# Patient Record
Sex: FEMALE | Race: WHITE | NOT HISPANIC OR LATINO | Employment: UNEMPLOYED | URBAN - METROPOLITAN AREA
[De-identification: names, ages, dates, MRNs, and addresses within clinical notes are randomized per-mention and may not be internally consistent; named-entity substitution may affect disease eponyms.]

---

## 2020-07-24 ENCOUNTER — HOSPITAL ENCOUNTER (INPATIENT)
Facility: HOSPITAL | Age: 65
LOS: 2 days | Discharge: HOME | DRG: 194 | End: 2020-07-26
Attending: EMERGENCY MEDICINE | Admitting: INTERNAL MEDICINE
Payer: MEDICARE

## 2020-07-24 ENCOUNTER — APPOINTMENT (EMERGENCY)
Dept: RADIOLOGY | Facility: HOSPITAL | Age: 65
DRG: 194 | End: 2020-07-24
Payer: MEDICARE

## 2020-07-24 DIAGNOSIS — E11.9 T2DM (TYPE 2 DIABETES MELLITUS) (HCC): ICD-10-CM

## 2020-07-24 DIAGNOSIS — R06.00 DYSPNEA: Primary | ICD-10-CM

## 2020-07-24 DIAGNOSIS — Z20.822 SUSPECTED COVID-19 VIRUS INFECTION: ICD-10-CM

## 2020-07-24 DIAGNOSIS — J18.9 PNEUMONIA: ICD-10-CM

## 2020-07-24 DIAGNOSIS — I10 HTN (HYPERTENSION): ICD-10-CM

## 2020-07-24 DIAGNOSIS — G25.81 RESTLESS LEG: ICD-10-CM

## 2020-07-24 DIAGNOSIS — R09.02 HYPOXIA: ICD-10-CM

## 2020-07-24 PROBLEM — I25.10 CAD (CORONARY ARTERY DISEASE): Status: ACTIVE | Noted: 2020-07-24

## 2020-07-24 PROBLEM — N18.9 CKD (CHRONIC KIDNEY DISEASE): Status: ACTIVE | Noted: 2020-07-24

## 2020-07-24 PROBLEM — K29.70 GASTRITIS: Status: ACTIVE | Noted: 2020-07-24

## 2020-07-24 PROBLEM — N18.30 CKD (CHRONIC KIDNEY DISEASE) STAGE 3, GFR 30-59 ML/MIN (HCC): Status: ACTIVE | Noted: 2020-07-24

## 2020-07-24 PROBLEM — R06.02 SOB (SHORTNESS OF BREATH): Status: ACTIVE | Noted: 2020-07-24

## 2020-07-24 LAB
ABO GROUP BLD: NORMAL
ALBUMIN SERPL BCP-MCNC: 2.6 G/DL (ref 3.5–5)
ALP SERPL-CCNC: 129 U/L (ref 46–116)
ALT SERPL W P-5'-P-CCNC: 40 U/L (ref 12–78)
ANION GAP SERPL CALCULATED.3IONS-SCNC: 10 MMOL/L (ref 4–13)
APTT PPP: 38 SECONDS (ref 23–37)
AST SERPL W P-5'-P-CCNC: 28 U/L (ref 5–45)
BACTERIA UR QL AUTO: ABNORMAL /HPF
BASOPHILS # BLD AUTO: 0.03 THOUSANDS/ΜL (ref 0–0.1)
BASOPHILS NFR BLD AUTO: 0 % (ref 0–1)
BILIRUB SERPL-MCNC: 0.3 MG/DL (ref 0.2–1)
BILIRUB UR QL STRIP: NEGATIVE
BUN SERPL-MCNC: 39 MG/DL (ref 5–25)
CALCIUM SERPL-MCNC: 8.6 MG/DL (ref 8.3–10.1)
CHLORIDE SERPL-SCNC: 101 MMOL/L (ref 100–108)
CK SERPL-CCNC: 61 U/L (ref 26–192)
CLARITY UR: ABNORMAL
CO2 SERPL-SCNC: 27 MMOL/L (ref 21–32)
COLOR UR: YELLOW
CREAT SERPL-MCNC: 1.99 MG/DL (ref 0.6–1.3)
CRP SERPL QL: 180 MG/L
D DIMER PPP FEU-MCNC: 1.06 UG/ML FEU
EOSINOPHIL # BLD AUTO: 0.36 THOUSAND/ΜL (ref 0–0.61)
EOSINOPHIL NFR BLD AUTO: 4 % (ref 0–6)
ERYTHROCYTE [DISTWIDTH] IN BLOOD BY AUTOMATED COUNT: 17.4 % (ref 11.6–15.1)
FERRITIN SERPL-MCNC: 115 NG/ML (ref 8–388)
GFR SERPL CREATININE-BSD FRML MDRD: 26 ML/MIN/1.73SQ M
GLUCOSE SERPL-MCNC: 102 MG/DL (ref 65–140)
GLUCOSE SERPL-MCNC: 107 MG/DL (ref 65–140)
GLUCOSE UR STRIP-MCNC: NEGATIVE MG/DL
HCT VFR BLD AUTO: 35 % (ref 34.8–46.1)
HGB BLD-MCNC: 11.6 G/DL (ref 11.5–15.4)
HGB UR QL STRIP.AUTO: NEGATIVE
IMM GRANULOCYTES # BLD AUTO: 0.03 THOUSAND/UL (ref 0–0.2)
IMM GRANULOCYTES NFR BLD AUTO: 0 % (ref 0–2)
INR PPP: 0.97 (ref 0.84–1.19)
KETONES UR STRIP-MCNC: NEGATIVE MG/DL
LACTATE SERPL-SCNC: 0.9 MMOL/L (ref 0.5–2)
LEUKOCYTE ESTERASE UR QL STRIP: ABNORMAL
LYMPHOCYTES # BLD AUTO: 1.14 THOUSANDS/ΜL (ref 0.6–4.47)
LYMPHOCYTES NFR BLD AUTO: 13 % (ref 14–44)
MCH RBC QN AUTO: 29.4 PG (ref 26.8–34.3)
MCHC RBC AUTO-ENTMCNC: 33.1 G/DL (ref 31.4–37.4)
MCV RBC AUTO: 89 FL (ref 82–98)
MONOCYTES # BLD AUTO: 0.67 THOUSAND/ΜL (ref 0.17–1.22)
MONOCYTES NFR BLD AUTO: 8 % (ref 4–12)
NEUTROPHILS # BLD AUTO: 6.47 THOUSANDS/ΜL (ref 1.85–7.62)
NEUTS SEG NFR BLD AUTO: 75 % (ref 43–75)
NITRITE UR QL STRIP: NEGATIVE
NON-SQ EPI CELLS URNS QL MICRO: ABNORMAL /HPF
NRBC BLD AUTO-RTO: 0 /100 WBCS
NT-PROBNP SERPL-MCNC: 1035 PG/ML
OTHER STN SPEC: ABNORMAL
PH UR STRIP.AUTO: 6 [PH]
PLATELET # BLD AUTO: 175 THOUSANDS/UL (ref 149–390)
PLATELET # BLD AUTO: 187 THOUSANDS/UL (ref 149–390)
PMV BLD AUTO: 12.3 FL (ref 8.9–12.7)
PMV BLD AUTO: 12.5 FL (ref 8.9–12.7)
POTASSIUM SERPL-SCNC: 3.9 MMOL/L (ref 3.5–5.3)
PROCALCITONIN SERPL-MCNC: 0.26 NG/ML
PROT SERPL-MCNC: 7.2 G/DL (ref 6.4–8.2)
PROT UR STRIP-MCNC: NEGATIVE MG/DL
PROTHROMBIN TIME: 12.8 SECONDS (ref 11.6–14.5)
RBC # BLD AUTO: 3.94 MILLION/UL (ref 3.81–5.12)
RBC #/AREA URNS AUTO: ABNORMAL /HPF
RH BLD: POSITIVE
SARS-COV-2 RNA RESP QL NAA+PROBE: NEGATIVE
SODIUM SERPL-SCNC: 138 MMOL/L (ref 136–145)
SP GR UR STRIP.AUTO: 1.01 (ref 1–1.03)
TROPONIN I SERPL-MCNC: <0.02 NG/ML
TROPONIN I SERPL-MCNC: <0.02 NG/ML
UROBILINOGEN UR QL STRIP.AUTO: 0.2 E.U./DL
WBC # BLD AUTO: 8.7 THOUSAND/UL (ref 4.31–10.16)
WBC #/AREA URNS AUTO: ABNORMAL /HPF

## 2020-07-24 PROCEDURE — 86140 C-REACTIVE PROTEIN: CPT | Performed by: NURSE PRACTITIONER

## 2020-07-24 PROCEDURE — 96360 HYDRATION IV INFUSION INIT: CPT

## 2020-07-24 PROCEDURE — 99285 EMERGENCY DEPT VISIT HI MDM: CPT

## 2020-07-24 PROCEDURE — 87635 SARS-COV-2 COVID-19 AMP PRB: CPT | Performed by: EMERGENCY MEDICINE

## 2020-07-24 PROCEDURE — 85379 FIBRIN DEGRADATION QUANT: CPT | Performed by: EMERGENCY MEDICINE

## 2020-07-24 PROCEDURE — 85730 THROMBOPLASTIN TIME PARTIAL: CPT | Performed by: EMERGENCY MEDICINE

## 2020-07-24 PROCEDURE — 83520 IMMUNOASSAY QUANT NOS NONAB: CPT | Performed by: NURSE PRACTITIONER

## 2020-07-24 PROCEDURE — 87040 BLOOD CULTURE FOR BACTERIA: CPT | Performed by: EMERGENCY MEDICINE

## 2020-07-24 PROCEDURE — 99283 EMERGENCY DEPT VISIT LOW MDM: CPT | Performed by: EMERGENCY MEDICINE

## 2020-07-24 PROCEDURE — 84484 ASSAY OF TROPONIN QUANT: CPT | Performed by: EMERGENCY MEDICINE

## 2020-07-24 PROCEDURE — 80053 COMPREHEN METABOLIC PANEL: CPT | Performed by: EMERGENCY MEDICINE

## 2020-07-24 PROCEDURE — 81001 URINALYSIS AUTO W/SCOPE: CPT | Performed by: EMERGENCY MEDICINE

## 2020-07-24 PROCEDURE — 99223 1ST HOSP IP/OBS HIGH 75: CPT | Performed by: INTERNAL MEDICINE

## 2020-07-24 PROCEDURE — 93005 ELECTROCARDIOGRAM TRACING: CPT

## 2020-07-24 PROCEDURE — 87086 URINE CULTURE/COLONY COUNT: CPT | Performed by: EMERGENCY MEDICINE

## 2020-07-24 PROCEDURE — 84145 PROCALCITONIN (PCT): CPT | Performed by: EMERGENCY MEDICINE

## 2020-07-24 PROCEDURE — 83880 ASSAY OF NATRIURETIC PEPTIDE: CPT | Performed by: EMERGENCY MEDICINE

## 2020-07-24 PROCEDURE — 86900 BLOOD TYPING SEROLOGIC ABO: CPT | Performed by: NURSE PRACTITIONER

## 2020-07-24 PROCEDURE — 71045 X-RAY EXAM CHEST 1 VIEW: CPT

## 2020-07-24 PROCEDURE — 83605 ASSAY OF LACTIC ACID: CPT | Performed by: EMERGENCY MEDICINE

## 2020-07-24 PROCEDURE — 86901 BLOOD TYPING SEROLOGIC RH(D): CPT | Performed by: NURSE PRACTITIONER

## 2020-07-24 PROCEDURE — 85610 PROTHROMBIN TIME: CPT | Performed by: EMERGENCY MEDICINE

## 2020-07-24 PROCEDURE — 82550 ASSAY OF CK (CPK): CPT | Performed by: NURSE PRACTITIONER

## 2020-07-24 PROCEDURE — 85049 AUTOMATED PLATELET COUNT: CPT | Performed by: INTERNAL MEDICINE

## 2020-07-24 PROCEDURE — 85025 COMPLETE CBC W/AUTO DIFF WBC: CPT | Performed by: EMERGENCY MEDICINE

## 2020-07-24 PROCEDURE — 82728 ASSAY OF FERRITIN: CPT | Performed by: NURSE PRACTITIONER

## 2020-07-24 PROCEDURE — 84484 ASSAY OF TROPONIN QUANT: CPT | Performed by: NURSE PRACTITIONER

## 2020-07-24 PROCEDURE — 36415 COLL VENOUS BLD VENIPUNCTURE: CPT | Performed by: EMERGENCY MEDICINE

## 2020-07-24 PROCEDURE — 82948 REAGENT STRIP/BLOOD GLUCOSE: CPT

## 2020-07-24 RX ORDER — AZITHROMYCIN 250 MG/1
500 TABLET, FILM COATED ORAL ONCE
Status: DISCONTINUED | OUTPATIENT
Start: 2020-07-24 | End: 2020-07-24

## 2020-07-24 RX ORDER — CEPHALEXIN 500 MG/1
500 CAPSULE ORAL EVERY 8 HOURS SCHEDULED
COMMUNITY
End: 2020-07-26 | Stop reason: HOSPADM

## 2020-07-24 RX ORDER — ENOXAPARIN SODIUM 300 MG/3ML
1 INJECTION INTRAVENOUS; SUBCUTANEOUS ONCE
Status: DISCONTINUED | OUTPATIENT
Start: 2020-07-24 | End: 2020-07-24 | Stop reason: CLARIF

## 2020-07-24 RX ORDER — HEPARIN SODIUM 5000 [USP'U]/ML
5000 INJECTION, SOLUTION INTRAVENOUS; SUBCUTANEOUS EVERY 8 HOURS SCHEDULED
Status: DISCONTINUED | OUTPATIENT
Start: 2020-07-24 | End: 2020-07-26 | Stop reason: HOSPADM

## 2020-07-24 RX ORDER — SACCHAROMYCES BOULARDII 250 MG
250 CAPSULE ORAL 2 TIMES DAILY
Status: DISCONTINUED | OUTPATIENT
Start: 2020-07-24 | End: 2020-07-26 | Stop reason: HOSPADM

## 2020-07-24 RX ORDER — ACETAMINOPHEN 325 MG/1
650 TABLET ORAL EVERY 6 HOURS PRN
Status: DISCONTINUED | OUTPATIENT
Start: 2020-07-24 | End: 2020-07-26 | Stop reason: HOSPADM

## 2020-07-24 RX ORDER — ACETAMINOPHEN 325 MG/1
650 TABLET ORAL EVERY 6 HOURS PRN
Status: DISCONTINUED | OUTPATIENT
Start: 2020-07-24 | End: 2020-07-24 | Stop reason: SDUPTHER

## 2020-07-24 RX ORDER — SUCRALFATE 1 G/1
1 TABLET ORAL 4 TIMES DAILY
Status: DISCONTINUED | OUTPATIENT
Start: 2020-07-24 | End: 2020-07-26 | Stop reason: HOSPADM

## 2020-07-24 RX ORDER — ZINC SULFATE 50(220)MG
220 CAPSULE ORAL DAILY
Status: DISCONTINUED | OUTPATIENT
Start: 2020-07-24 | End: 2020-07-26 | Stop reason: HOSPADM

## 2020-07-24 RX ORDER — SUCRALFATE 1 G/1
1 TABLET ORAL 4 TIMES DAILY
COMMUNITY

## 2020-07-24 RX ORDER — CEFTRIAXONE 1 G/50ML
1000 INJECTION, SOLUTION INTRAVENOUS EVERY 24 HOURS
Status: DISCONTINUED | OUTPATIENT
Start: 2020-07-25 | End: 2020-07-25

## 2020-07-24 RX ORDER — NICOTINE 21 MG/24HR
1 PATCH, TRANSDERMAL 24 HOURS TRANSDERMAL DAILY
Status: DISCONTINUED | OUTPATIENT
Start: 2020-07-25 | End: 2020-07-24 | Stop reason: SDUPTHER

## 2020-07-24 RX ORDER — FLUOXETINE HYDROCHLORIDE 20 MG/1
40 CAPSULE ORAL 2 TIMES DAILY
Status: DISCONTINUED | OUTPATIENT
Start: 2020-07-24 | End: 2020-07-26 | Stop reason: HOSPADM

## 2020-07-24 RX ORDER — LORATADINE 10 MG/1
10 TABLET ORAL DAILY
COMMUNITY

## 2020-07-24 RX ORDER — ASCORBIC ACID 500 MG
1000 TABLET ORAL 2 TIMES DAILY
Status: DISCONTINUED | OUTPATIENT
Start: 2020-07-24 | End: 2020-07-26 | Stop reason: HOSPADM

## 2020-07-24 RX ORDER — PANTOPRAZOLE SODIUM 40 MG/1
40 TABLET, DELAYED RELEASE ORAL DAILY
COMMUNITY

## 2020-07-24 RX ORDER — LORATADINE 10 MG/1
10 TABLET ORAL DAILY
Status: DISCONTINUED | OUTPATIENT
Start: 2020-07-25 | End: 2020-07-26 | Stop reason: HOSPADM

## 2020-07-24 RX ORDER — BUSPIRONE HYDROCHLORIDE 15 MG/1
15 TABLET ORAL 2 TIMES DAILY
COMMUNITY

## 2020-07-24 RX ORDER — AZITHROMYCIN 250 MG/1
250 TABLET, FILM COATED ORAL DAILY
Qty: 4 TABLET | Refills: 0 | Status: SHIPPED | OUTPATIENT
Start: 2020-07-24 | End: 2020-07-26 | Stop reason: HOSPADM

## 2020-07-24 RX ORDER — NICOTINE 21 MG/24HR
1 PATCH, TRANSDERMAL 24 HOURS TRANSDERMAL DAILY
Status: DISCONTINUED | OUTPATIENT
Start: 2020-07-25 | End: 2020-07-26 | Stop reason: HOSPADM

## 2020-07-24 RX ORDER — ARIPIPRAZOLE 15 MG/1
15 TABLET ORAL DAILY
COMMUNITY

## 2020-07-24 RX ORDER — FELODIPINE 2.5 MG/1
5 TABLET, EXTENDED RELEASE ORAL
Status: DISCONTINUED | OUTPATIENT
Start: 2020-07-25 | End: 2020-07-26

## 2020-07-24 RX ORDER — ALBUTEROL SULFATE 90 UG/1
2 AEROSOL, METERED RESPIRATORY (INHALATION) EVERY 6 HOURS PRN
COMMUNITY

## 2020-07-24 RX ORDER — PANTOPRAZOLE SODIUM 40 MG/1
40 TABLET, DELAYED RELEASE ORAL DAILY
Status: DISCONTINUED | OUTPATIENT
Start: 2020-07-25 | End: 2020-07-26 | Stop reason: HOSPADM

## 2020-07-24 RX ORDER — SODIUM CHLORIDE 9 MG/ML
75 INJECTION, SOLUTION INTRAVENOUS CONTINUOUS
Status: DISCONTINUED | OUTPATIENT
Start: 2020-07-24 | End: 2020-07-25

## 2020-07-24 RX ORDER — ONDANSETRON 2 MG/ML
4 INJECTION INTRAMUSCULAR; INTRAVENOUS EVERY 6 HOURS PRN
Status: DISCONTINUED | OUTPATIENT
Start: 2020-07-24 | End: 2020-07-26 | Stop reason: HOSPADM

## 2020-07-24 RX ORDER — MELATONIN
2000 DAILY
Status: DISCONTINUED | OUTPATIENT
Start: 2020-07-24 | End: 2020-07-26 | Stop reason: HOSPADM

## 2020-07-24 RX ORDER — DOXYCYCLINE HYCLATE 100 MG/1
100 CAPSULE ORAL EVERY 12 HOURS SCHEDULED
Status: DISCONTINUED | OUTPATIENT
Start: 2020-07-24 | End: 2020-07-26 | Stop reason: HOSPADM

## 2020-07-24 RX ORDER — ONDANSETRON 2 MG/ML
4 INJECTION INTRAMUSCULAR; INTRAVENOUS EVERY 6 HOURS PRN
Status: DISCONTINUED | OUTPATIENT
Start: 2020-07-24 | End: 2020-07-24 | Stop reason: SDUPTHER

## 2020-07-24 RX ORDER — IBUPROFEN 400 MG/1
400 TABLET ORAL EVERY 8 HOURS PRN
COMMUNITY

## 2020-07-24 RX ORDER — ALBUTEROL SULFATE 90 UG/1
2 AEROSOL, METERED RESPIRATORY (INHALATION) EVERY 4 HOURS PRN
Status: DISCONTINUED | OUTPATIENT
Start: 2020-07-24 | End: 2020-07-26 | Stop reason: HOSPADM

## 2020-07-24 RX ORDER — HYDROCHLOROTHIAZIDE 25 MG/1
25 TABLET ORAL DAILY
COMMUNITY

## 2020-07-24 RX ORDER — FLUOXETINE HYDROCHLORIDE 40 MG/1
40 CAPSULE ORAL 2 TIMES DAILY
COMMUNITY

## 2020-07-24 RX ADMIN — AZITHROMYCIN MONOHYDRATE 500 MG: 500 INJECTION, POWDER, LYOPHILIZED, FOR SOLUTION INTRAVENOUS at 18:36

## 2020-07-24 RX ADMIN — OXYCODONE HYDROCHLORIDE AND ACETAMINOPHEN 1000 MG: 500 TABLET ORAL at 21:12

## 2020-07-24 RX ADMIN — VITAMIN D, TAB 1000IU (100/BT) 2000 UNITS: 25 TAB at 21:12

## 2020-07-24 RX ADMIN — ZINC SULFATE 220 MG (50 MG) CAPSULE 220 MG: CAPSULE at 21:12

## 2020-07-24 RX ADMIN — HEPARIN SODIUM 5000 UNITS: 5000 INJECTION INTRAVENOUS; SUBCUTANEOUS at 21:19

## 2020-07-24 RX ADMIN — SODIUM CHLORIDE 500 ML: 0.9 INJECTION, SOLUTION INTRAVENOUS at 17:24

## 2020-07-24 RX ADMIN — BUSPIRONE HYDROCHLORIDE 15 MG: 10 TABLET ORAL at 21:12

## 2020-07-24 RX ADMIN — DOXYCYCLINE 100 MG: 100 CAPSULE ORAL at 21:12

## 2020-07-24 RX ADMIN — SUCRALFATE 1 G: 1 TABLET ORAL at 21:19

## 2020-07-24 RX ADMIN — SODIUM CHLORIDE 75 ML/HR: 0.9 INJECTION, SOLUTION INTRAVENOUS at 20:54

## 2020-07-24 RX ADMIN — Medication 250 MG: at 21:12

## 2020-07-24 RX ADMIN — FLUOXETINE 40 MG: 20 CAPSULE ORAL at 21:12

## 2020-07-24 NOTE — H&P
H&P- Manjeet Hernandez 1955, 75 y o  female MRN: [id number]    Unit/Bed#: ED 10 Encounter: [id number]    Primary Care Provider: No primary care provider on file  Date and time admitted to hospital: 7/24/2020  2:59 PM        * SOB (shortness of breath)  Assessment & Plan  Patient present to the ED with shortness of breath for 3 days  Patient is visiting from [place] and arrived to [place] on 17th  Patient does have fever with some chills  Patient has cough with mucus production  In the ED patient had low-grade fever and tachypnea  O2 saturation was in low 90s but dropped to high 80s with exertion  COVID-19 testing was negative  Chest x-ray showed bilateral ground-glass densities suspicious for COVID infection  Patient will be on IV Rocephin and Zithromax  Check sputum culture, procalcitonin level, urine for Legionella pneumococcal antigens  Will also get a CT chest to assess the lung parenchyma  Pulmonary consultation    Suspected Covid-19 Virus Infection  Assessment & Plan  Patient is visiting from [place]  COVID-19 testing was negative in ED  Chest x-ray showed bilateral ground-glass opacities  Patient's O2 saturation was low 90s at rest but dropping to high 80s with exertion  Will check blood type, cardiac markers, CRP, ferritin  Check IL 6 level  Patient will be on vitamin D3 2000 units daily  CT chest ordered    CAD (coronary artery disease)  Assessment & Plan  Patient follows up with cardiology cardiology recommended outpatient nuclear stress test  No symptoms of angina at the current time  Initial troponin was negative  Monitor serial troponins    T2DM (type 2 diabetes mellitus) ([facility] [address] )  Assessment & Plan  No results found for: HGBA1C    No results for input(s): POCGLU in the last 72 hours  Blood Sugar Average: Last 72 hrs:  Patient is on metformin which will be held due to elevated creatinine  Patient will be on Humalog sliding scale with Accu-Cheks q a c   And HS        Gastritis  Assessment & Plan  Continue Protonix and Carafate    HTN (hypertension)  Assessment & Plan  Patient is on hydrochlorothiazide will be held at the current time    CKD (chronic kidney disease) stage 3, GFR 30-59 ml/min ([facility])  Assessment & Plan  Patient's baseline creatinine appears to be around 1 5-1 9 recently  Creatinine slightly above baseline  Will give gentle IV hydration  Hold metformin for now  Avoid nephrotoxic agents and hypotension  Hold hydrochlorothiazide        VTE Prophylaxis: Heparin  / sequential compression device   Code Status:  Full code    Discussion with family:  Daughter bedside    Anticipated Length of Stay:  Patient will be admitted on an Inpatient basis with an anticipated length of stay of  more than 2 midnights  Justification for Hospital Stay:  Shortness of breath and suspected COVID infection    Total Time for Visit, including Counseling / Coordination of Care: 1 hour  Greater than 50% of this total time spent on direct patient counseling and coordination of care  Chief Complaint:       History of Present Illness:    Amna Barboza is a 75 y o  female with past medical history of chronic kidney disease stage 3, CAD hypertension, hyperlipidemia , depression , carotid disease who presents with shortness of breath for 4 days  Patient reports she is from [place] and came to [place]  Patient complains of shortness of breath which is especially worse with exertion  Patient does complain of cough with mucus phlegm  Patient did have fever with chills yesterday  Patient denies any chest pain but complains of chest tightness  Patient denies any abdominal pain, nausea, vomiting, headache or dizziness  Patient present to the ED where she had low-grade temperature and was slightly tachypneic  Patient's O2 saturation was low 90s on room air but dropped into high 80s on exertion  Labs showed BUN creatinine of 39 and 1 9 slightly elevated proBNP of 1000  Patient's D-dimer was 1 06    Patient's COVID-19 testing was negative  Patient's chest x-ray showed bilateral ground-glass densities suggesting pneumonia    Review of Systems:    Review of Systems   Constitutional: Positive for fever  Negative for activity change, appetite change, chills, diaphoresis, fatigue and unexpected weight change  HENT: Negative for congestion, ear discharge, ear pain, facial swelling, hearing loss, mouth sores, nosebleeds, postnasal drip, rhinorrhea, sinus pressure, sneezing, sore throat, tinnitus, trouble swallowing and voice change  Eyes: Negative for photophobia, discharge, redness and visual disturbance  Respiratory: Positive for cough, chest tightness and shortness of breath  Negative for wheezing and stridor  Cardiovascular: Negative for chest pain, palpitations and leg swelling  Gastrointestinal: Negative for abdominal distention, abdominal pain, anal bleeding, blood in stool, constipation, diarrhea, nausea and vomiting  Endocrine: Negative for polydipsia, polyphagia and polyuria  Genitourinary: Negative for decreased urine volume, difficulty urinating, dysuria, flank pain, hematuria, menstrual problem, pelvic pain, urgency, vaginal bleeding and vaginal discharge  Musculoskeletal: Negative for arthralgias, back pain and neck stiffness  Skin: Negative for pallor and rash  Neurological: Negative for dizziness, seizures, facial asymmetry, speech difficulty, light-headedness, numbness and headaches  Hematological: Negative for adenopathy  Psychiatric/Behavioral: Negative for agitation and confusion         Past Medical and Surgical History:     Past Medical History:   Diagnosis Date    CAD (coronary artery disease)     Carotid arterial disease ([place] [address] )     CKD (chronic kidney disease) stage 3, GFR 30-59 ml/min ([facility])     Gastritis     HTN (hypertension)     T2DM (type 2 diabetes mellitus) ([place] [address] )        Past Surgical History:   Procedure Laterality Date    ABDOMINAL HERNIA REPAIR      APPENDECTOMY  BACK SURGERY      CHOLECYSTECTOMY      TOTAL HIP ARTHROPLASTY         Meds/Allergies:    Prior to Admission medications    Medication Sig Start Date End Date Taking? Authorizing Provider   albuterol (PROVENTIL HFA,VENTOLIN HFA) 90 mcg/act inhaler Inhale 2 puffs every 6 (six) hours as needed for wheezing   Yes Historical Provider, MD   ARIPiprazole (ABILIFY) 15 mg tablet Take 15 mg by mouth daily   Yes Historical Provider, MD   busPIRone (BUSPAR) 15 mg tablet Take 15 mg by mouth 2 (two) times a day   Yes Historical Provider, MD   cephalexin (KEFLEX) 500 mg capsule Take 500 mg by mouth every 8 (eight) hours   Yes Historical Provider, MD   FLUoxetine (PROzac) 40 MG capsule Take 40 mg by mouth 2 (two) times a day   Yes Historical Provider, MD   hydrochlorothiazide (HYDRODIURIL) 25 mg tablet Take 25 mg by mouth daily   Yes Historical Provider, MD   ibuprofen (MOTRIN) 400 mg tablet Take 400 mg by mouth every 8 (eight) hours as needed for mild pain   Yes Historical Provider, MD   loratadine (CLARITIN) 10 mg tablet Take 10 mg by mouth daily   Yes Historical Provider, MD   metFORMIN (GLUCOPHAGE) 500 mg tablet Take 500 mg by mouth 2 (two) times a day with meals   Yes Historical Provider, MD   pantoprazole (PROTONIX) 40 mg tablet Take 40 mg by mouth daily   Yes Historical Provider, MD   sucralfate (CARAFATE) 1 g tablet Take 1 g by mouth 4 (four) times a day   Yes Historical Provider, MD   azithromycin (ZITHROMAX) 250 mg tablet Take 1 tablet (250 mg total) by mouth daily for 4 days 7/24/20 9/86/74  Prabha Beckford MD     I have reviewed home medications using allscripts  Allergies:    Allergies   Allergen Reactions    Penicillins     Sulfa Antibiotics        Social History:     Marital Status:      Substance Use History:   Social History     Substance and Sexual Activity   Alcohol Use Not Currently     Social History     Tobacco Use   Smoking Status Current Every Day Smoker    Packs/day: 0 50    Years: 40 00  Pack years: 20 00    Types: Cigarettes   Smokeless Tobacco Never Used     Social History     Substance and Sexual Activity   Drug Use Never       Family History:    Family History   Problem Relation Age of Onset    Diabetes Mother     Hypertension Mother     COPD Father     Hypertension Father        Physical Exam:     Vitals:   Blood Pressure: 161/73 (07/24/20 1930)  Pulse: 86 (07/24/20 1930)  Temperature: 99 1 °F (37 3 °C) (07/24/20 1458)  Temp Source: Tympanic (07/24/20 1458)  Respirations: (!) 24 (07/24/20 1900)  SpO2: 97 % (07/24/20 1930)    Physical Exam   Constitutional: No distress  HENT:   Head: Normocephalic and atraumatic  Nose: Nose normal    Eyes: Pupils are equal, round, and reactive to light  Conjunctivae are normal    Neck: Normal range of motion  Neck supple  Cardiovascular: Normal rate, regular rhythm and normal heart sounds  Pulmonary/Chest: Effort normal and breath sounds normal  No respiratory distress  She has no wheezes  She has no rales  Abdominal: Soft  Bowel sounds are normal  She exhibits no distension  There is no tenderness  There is no rebound and no guarding  Musculoskeletal: She exhibits no edema  Neurological: She is alert  No cranial nerve deficit  Skin: Skin is warm and dry  No rash noted           Additional Data:     Lab Results: I have personally reviewed pertinent films in PACS    Results from last 7 days   Lab Units 07/24/20  1534   WBC Thousand/uL 8 70   HEMOGLOBIN g/dL 11 6   HEMATOCRIT % 35 0   PLATELETS Thousands/uL 187   NEUTROS PCT % 75   LYMPHS PCT % 13*   MONOS PCT % 8   EOS PCT % 4     Results from last 7 days   Lab Units 07/24/20  1606   SODIUM mmol/L 138   POTASSIUM mmol/L 3 9   CHLORIDE mmol/L 101   CO2 mmol/L 27   BUN mg/dL 39*   CREATININE mg/dL 1 99*   ANION GAP mmol/L 10   CALCIUM mg/dL 8 6   ALBUMIN g/dL 2 6*   TOTAL BILIRUBIN mg/dL 0 30   ALK PHOS U/L 129*   ALT U/L 40   AST U/L 28   GLUCOSE RANDOM mg/dL 107     Results from last 7 days   Lab Units 07/24/20  1606   INR  0 97             Results from last 7 days   Lab Units 07/24/20  1534   LACTIC ACID mmol/L 0 9       Imaging: I have personally reviewed pertinent films in PACS    XR chest 1 view portable   Final Result by Marley Ferrari MD (07/24 1719)      Patchy bilateral groundglass opacity, question viral infection, possibly Covid-19 pneumonia, despite negative PCR  The study was marked in [facility] for immediate notification  Workstation performed: [id number]             EKG, Pathology, and Other Studies Reviewed on Admission:   · EKG:  EKG shows normal sinus rhythm at 88 beats per minute with nonspecific T changes    Allscripts / Epic Records Reviewed: Yes     ** Please Note: This note has been constructed using a voice recognition system   **

## 2020-07-24 NOTE — ED PROVIDER NOTES
History  Chief Complaint   Patient presents with    Shortness of Breath     patient c/o SOB and possible fever x four days  denies cough  74 yo female visiting daughter from [place], has been here x 7 days  C/o sob x 4 days  Has felt feverish but did not take temp  No cough  No vomiting or diarrhea  No heart problems or asthma or COPD    + smoker  History provided by:  Patient   used: No    Shortness of Breath   Associated symptoms: fever    Associated symptoms: no abdominal pain, no chest pain, no cough, no headaches, no rash and no vomiting        Prior to Admission Medications   Prescriptions Last Dose Informant Patient Reported? Taking? cephalexin (KEFLEX) 500 mg capsule   Yes Yes   Sig: Take 500 mg by mouth every 8 (eight) hours   hydrochlorothiazide (HYDRODIURIL) 25 mg tablet 7/24/2020 at Unknown time  Yes Yes   Sig: Take 25 mg by mouth daily   ibuprofen (MOTRIN) 400 mg tablet   Yes Yes   Sig: Take 400 mg by mouth every 8 (eight) hours as needed for mild pain   loratadine (CLARITIN) 10 mg tablet   Yes Yes   Sig: Take 10 mg by mouth daily      Facility-Administered Medications: None       History reviewed  No pertinent past medical history  History reviewed  No pertinent surgical history  History reviewed  No pertinent family history  I have reviewed and agree with the history as documented  E-Cigarette/Vaping     E-Cigarette/Vaping Substances     Social History     Tobacco Use    Smoking status: Current Every Day Smoker     Packs/day: 0 50     Types: Cigarettes    Smokeless tobacco: Never Used   Substance Use Topics    Alcohol use: Not Currently    Drug use: Never       Review of Systems   Constitutional: Positive for chills and fever  HENT: Negative  Eyes: Negative  Respiratory: Positive for shortness of breath  Negative for cough  Cardiovascular: Negative  Negative for chest pain  Gastrointestinal: Negative    Negative for abdominal pain, diarrhea, nausea and vomiting  Genitourinary: Negative  Negative for dysuria and flank pain  Musculoskeletal: Negative  Negative for back pain and myalgias  Skin: Negative  Negative for rash and wound  Neurological: Negative  Negative for dizziness and headaches  Hematological: Does not bruise/bleed easily  Psychiatric/Behavioral: Negative  All other systems reviewed and are negative  Physical Exam  Physical Exam   Constitutional: She is oriented to person, place, and time  She appears well-developed and well-nourished  No distress  HENT:   Head: Normocephalic and atraumatic  Eyes: Conjunctivae are normal  No scleral icterus  Neck: Normal range of motion  Neck supple  Cardiovascular: Normal rate, regular rhythm and normal heart sounds  No murmur heard  Pulmonary/Chest: Effort normal and breath sounds normal  No respiratory distress  Abdominal: Soft  Bowel sounds are normal  She exhibits no distension  There is no tenderness  Musculoskeletal: Normal range of motion  She exhibits no edema, tenderness or deformity  No post calf tenderness   Neurological: She is alert and oriented to person, place, and time  No cranial nerve deficit  She exhibits normal muscle tone  Skin: Skin is warm and dry  No rash noted  She is not diaphoretic  No pallor  Psychiatric: She has a normal mood and affect  Her behavior is normal    Nursing note and vitals reviewed        Vital Signs  ED Triage Vitals [07/24/20 1458]   Temperature Pulse Respirations Blood Pressure SpO2   99 1 °F (37 3 °C) 99 (!) 24 169/78 96 %      Temp Source Heart Rate Source Patient Position - Orthostatic VS BP Location FiO2 (%)   Tympanic Monitor Sitting Right arm --      Pain Score       5           Vitals:    07/24/20 1458 07/24/20 1730   BP: 169/78 148/67   Pulse: 99 84   Patient Position - Orthostatic VS: Sitting          Visual Acuity      ED Medications  Medications   sodium chloride 0 9 % bolus 500 mL (500 mL Intravenous New Bag 7/24/20 1724)   azithromycin (ZITHROMAX) 500 mg in sodium chloride 0 9% 250mL IVPB 500 mg (has no administration in time range)       Diagnostic Studies  Results Reviewed     Procedure Component Value Units Date/Time    UA (URINE) with reflex to Scope [[id number]] Collected:  07/24/20 1813    Lab Status:  No result Specimen:  Urine, Clean Catch     Urine culture [[id number]] Collected:  07/24/20 1813    Lab Status:  No result Specimen:  Urine, Clean Catch     NT-BNP PRO [[id number]]  (Abnormal) Collected:  07/24/20 1606    Lab Status:  Final result Specimen:  Blood from Arm, Right Updated:  07/24/20 1732     NT-proBNP 1,035 pg/mL     Novel Coronavirus (Covid-19),PCR [facility] [[id number]]  (Normal) Collected:  07/24/20 1534    Lab Status:  Final result Specimen:  Nares from Nose Updated:  07/24/20 1635     SARS-CoV-2 Negative    Narrative: The specimen collection materials, transport medium, and/or testing methodology utilized in the production of these test results have been proven to be reliable in a limited validation with an abbreviated program under the Emergency Utilization Authorization provided by the FDA  Testing reported as "Presumptive positive" will be confirmed with secondary testing with a reference laboratory to ensure result accuracy  Clinical caution and judgement should be used with the interpretation of these results with consideration of the clinical impression and other laboratory testing  Testing reported as "Positive" or "Negative" has been proven to be accurate according to standard laboratory validation requirements  All testing is performed with control materials showing appropriate reactivity at standard intervals        Troponin I [[id number]]  (Normal) Collected:  07/24/20 1606    Lab Status:  Final result Specimen:  Blood from Arm, Right Updated:  07/24/20 1634     Troponin I <0 02 ng/mL     D-dimer, quantitative [[id number]]  (Abnormal) Collected:  07/24/20 1606 Lab Status:  Final result Specimen:  Blood from Arm, Right Updated:  07/24/20 1633     D-Dimer, Quant 1 06 ug/ml FEU     Comprehensive metabolic panel [[id number]]  (Abnormal) Collected:  07/24/20 1606    Lab Status:  Final result Specimen:  Blood from Arm, Right Updated:  07/24/20 1632     Sodium 138 mmol/L      Potassium 3 9 mmol/L      Chloride 101 mmol/L      CO2 27 mmol/L      ANION GAP 10 mmol/L      BUN 39 mg/dL      Creatinine 1 99 mg/dL      Glucose 107 mg/dL      Calcium 8 6 mg/dL      AST 28 U/L      ALT 40 U/L      Alkaline Phosphatase 129 U/L      Total Protein 7 2 g/dL      Albumin 2 6 g/dL      Total Bilirubin 0 30 mg/dL      eGFR 26 ml/min/1 73sq m     Narrative:       [place] guidelines for Chronic Kidney Disease (CKD):     Stage 1 with normal or high GFR (GFR > 90 mL/min/1 73 square meters)    Stage 2 Mild CKD (GFR = 60-89 mL/min/1 73 square meters)    Stage 3A Moderate CKD (GFR = 45-59 mL/min/1 73 square meters)    Stage 3B Moderate CKD (GFR = 30-44 mL/min/1 73 square meters)    Stage 4 Severe CKD (GFR = 15-29 mL/min/1 73 square meters)    Stage 5 End Stage CKD (GFR <15 mL/min/1 73 square meters)  Note: GFR calculation is accurate only with a steady state creatinine    Protime-INR [[id number]]  (Normal) Collected:  07/24/20 1606    Lab Status:  Final result Specimen:  Blood from Arm, Right Updated:  07/24/20 1628     Protime 12 8 seconds      INR 0 97    APTT [[id number]]  (Abnormal) Collected:  07/24/20 1606    Lab Status:  Final result Specimen:  Blood from Arm, Right Updated:  07/24/20 1628     PTT 38 seconds     Procalcitonin [[id number]] Collected:  07/24/20 1607    Lab Status:   In process Specimen:  Blood from Arm, Right Updated:  07/24/20 1612    Lactic Acid [[id number]]  (Normal) Collected:  07/24/20 1534    Lab Status:  Final result Specimen:  Blood from Arm, Right Updated:  07/24/20 1608     LACTIC ACID 0 9 mmol/L     Narrative:       Result may be elevated if tourniquet was used during collection  CBC and differential [[id number]]  (Abnormal) Collected:  07/24/20 1534    Lab Status:  Final result Specimen:  Blood from Arm, Right Updated:  07/24/20 1547     WBC 8 70 Thousand/uL      RBC 3 94 Million/uL      Hemoglobin 11 6 g/dL      Hematocrit 35 0 %      MCV 89 fL      MCH 29 4 pg      MCHC 33 1 g/dL      RDW 17 4 %      MPV 12 5 fL      Platelets 174 Thousands/uL      nRBC 0 /100 WBCs      Neutrophils Relative 75 %      Immat GRANS % 0 %      Lymphocytes Relative 13 %      Monocytes Relative 8 %      Eosinophils Relative 4 %      Basophils Relative 0 %      Neutrophils Absolute 6 47 Thousands/µL      Immature Grans Absolute 0 03 Thousand/uL      Lymphocytes Absolute 1 14 Thousands/µL      Monocytes Absolute 0 67 Thousand/µL      Eosinophils Absolute 0 36 Thousand/µL      Basophils Absolute 0 03 Thousands/µL     Blood culture #2 [[id number]] Collected:  07/24/20 1538    Lab Status: In process Specimen:  Blood from Hand, Left Updated:  07/24/20 1546    Blood culture #1 [[id number]] Collected:  07/24/20 1534    Lab Status: In process Specimen:  Blood from Arm, Right Updated:  07/24/20 1545                 XR chest 1 view portable   Final Result by Leigh Ann Byrd MD (07/24 1719)      Patchy bilateral groundglass opacity, question viral infection, possibly Covid-19 pneumonia, despite negative PCR  The study was marked in [facility] for immediate notification        Workstation performed: [id number]                    Procedures  ECG 12 Lead Documentation Only  Date/Time: 7/24/2020 3:46 PM  Performed by: Salvador Crump MD  Authorized by: Salvador Crump MD     Indications / Diagnosis:  Sob  ECG reviewed by me, the ED Provider: yes    Patient location:  ED  Previous ECG:     Previous ECG:  Unavailable  Interpretation:     Interpretation: normal    Rate:     ECG rate:  88    ECG rate assessment: normal    Rhythm:     Rhythm: sinus rhythm    Ectopy:     Ectopy: none    QRS:     QRS axis:  Normal  Conduction:     Conduction: normal    ST segments:     ST segments:  Normal  T waves:     T waves: normal               ED Course       US AUDIT      Most Recent Value   Initial Alcohol Screen: US AUDIT-C    1  How often do you have a drink containing alcohol?  0 Filed at: 07/24/2020 1502   2  How many drinks containing alcohol do you have on a typical day you are drinking? 0 Filed at: 07/24/2020 1502   3b  FEMALE Any Age, or MALE 65+: How often do you have 4 or more drinks on one occassion? 0 Filed at: 07/24/2020 1502   Audit-C Score  0 Filed at: 07/24/2020 1502                  JOVI/DAST-10      Most Recent Value   How many times in the past year have you    Used an illegal drug or used a prescription medication for non-medical reasons? Never Filed at: 07/24/2020 1502                                [facility]  Number of Diagnoses or Management Options  Dyspnea:   Hypoxia:   Pneumonia:   Suspected Covid-19 Virus Infection:   Diagnosis management comments: 0298 -Unable to do CTA due to GFR 26 - discussed with radiologist who advised hydration and repeat GFR or we could do perfusion scan  Apparently nuclear med tech only on call for [address] [place] [address] Street so would need to transfer her for study  1715 - discussed again with radiology, chest xray c/w covid despite negative swab  Pt  Does not have leg swelling or pain or any signs of DVT  I feel diagnosis of covid much more likely to explain her symptoms  Pt  Getting hydrated  1745 - discussed with pt  And her daughter  Pulse ox at rest now 91-92%  I had pt  Do a little bit of ambulation around room and pt  Got winded and pulse of dropped to 88%  Will admit          Disposition  Final diagnoses:   Dyspnea   Pneumonia   Suspected Covid-19 Virus Infection   Hypoxia     Time reflects when diagnosis was documented in both MDM as applicable and the Disposition within this note     Time User Action Codes Description Comment    7/24/2020  5:58 PM Charley Reyna Add [S99 08] Dyspnea     4/16/3773  9:10 PM Soniaaly WHARTON Add [P58 9] Pneumonia     4/84/8146  5:53 PM Sonia WHARTON Add [X90 986] Suspected Covid-19 Virus Infection     4/51/6501  0:73 PM Charley Reyna Add [G83 16] Hypoxia       ED Disposition     ED Disposition Condition Date/Time Comment    Admit Stable Fri Jul 24, 2020  6:16 PM Case was discussed with **hospitalist* and the patient's admission status was agreed to be Admission Status: inpatient status to the service of Dr Zheng Bowie*   Follow-up Information    None         Patient's Medications   Discharge Prescriptions    AZITHROMYCIN (ZITHROMAX) 250 MG TABLET    Take 1 tablet (250 mg total) by mouth daily for 4 days       Start Date: 7/24/2020 End Date: 7/28/2020       Order Dose: 250 mg       Quantity: 4 tablet    Refills: 0     No discharge procedures on file      PDMP Review     None          ED Provider  Electronically Signed by           Alex Fish MD  95/13/40 1956

## 2020-07-24 NOTE — ASSESSMENT & PLAN NOTE
Patient's baseline creatinine appears to be around 1 5-1 9 recently  Creatinine slightly above baseline  Will give gentle IV hydration  Hold metformin for now  Avoid nephrotoxic agents and hypotension  Hold hydrochlorothiazide

## 2020-07-24 NOTE — ASSESSMENT & PLAN NOTE
No results found for: HGBA1C    No results for input(s): POCGLU in the last 72 hours  Blood Sugar Average: Last 72 hrs:  Patient is on metformin which will be held due to elevated creatinine  Patient will be on Humalog sliding scale with Accu-Cheks q a c   And HS

## 2020-07-24 NOTE — ED NOTES
KLAUS called for transport arrangement for pt to go to nuclear med at [facility] for appt at [address] [place] [address] to call back with arrangements        Michelle Browne RN  07/24/20 9825

## 2020-07-24 NOTE — ASSESSMENT & PLAN NOTE
Patient is visiting from [place]  COVID-19 testing was negative in ED  Chest x-ray showed bilateral ground-glass opacities  Patient's O2 saturation was low 90s at rest but dropping to high 80s with exertion  Will check blood type, cardiac markers, CRP, ferritin  Check IL 6 level  Patient will be on vitamin D3 2000 units daily  CT chest ordered

## 2020-07-24 NOTE — ASSESSMENT & PLAN NOTE
Patient present to the ED with shortness of breath for 3 days  Patient is visiting from [place] and arrived to [place] on 17th  Patient does have fever with some chills  Patient has cough with mucus production  In the ED patient had low-grade fever and tachypnea  O2 saturation was in low 90s but dropped to high 80s with exertion  COVID-19 testing was negative  Chest x-ray showed bilateral ground-glass densities suspicious for COVID infection  Patient will be on IV Rocephin and Zithromax  Check sputum culture, procalcitonin level, urine for Legionella pneumococcal antigens  Will also get a CT chest to assess the lung parenchyma  Pulmonary consultation

## 2020-07-24 NOTE — ASSESSMENT & PLAN NOTE
Patient follows up with cardiology cardiology recommended outpatient nuclear stress test  No symptoms of angina at the current time  Initial troponin was negative  Monitor serial troponins

## 2020-07-25 ENCOUNTER — APPOINTMENT (INPATIENT)
Dept: RADIOLOGY | Facility: HOSPITAL | Age: 65
DRG: 194 | End: 2020-07-25
Payer: MEDICARE

## 2020-07-25 PROBLEM — I50.9 CHF (CONGESTIVE HEART FAILURE) (HCC): Status: ACTIVE | Noted: 2020-07-25

## 2020-07-25 PROBLEM — I50.32 CHRONIC DIASTOLIC CONGESTIVE HEART FAILURE (HCC): Status: ACTIVE | Noted: 2020-07-25

## 2020-07-25 LAB
ALBUMIN SERPL BCP-MCNC: 2.3 G/DL (ref 3.5–5)
ALP SERPL-CCNC: 117 U/L (ref 46–116)
ALT SERPL W P-5'-P-CCNC: 31 U/L (ref 12–78)
ANION GAP SERPL CALCULATED.3IONS-SCNC: 10 MMOL/L (ref 4–13)
AST SERPL W P-5'-P-CCNC: 23 U/L (ref 5–45)
BACTERIA UR CULT: NORMAL
BASOPHILS # BLD AUTO: 0.04 THOUSANDS/ΜL (ref 0–0.1)
BASOPHILS NFR BLD AUTO: 1 % (ref 0–1)
BILIRUB SERPL-MCNC: 0.3 MG/DL (ref 0.2–1)
BUN SERPL-MCNC: 33 MG/DL (ref 5–25)
CALCIUM SERPL-MCNC: 7.9 MG/DL (ref 8.3–10.1)
CHLORIDE SERPL-SCNC: 105 MMOL/L (ref 100–108)
CO2 SERPL-SCNC: 22 MMOL/L (ref 21–32)
CREAT SERPL-MCNC: 1.69 MG/DL (ref 0.6–1.3)
CRP SERPL QL: 164.2 MG/L
D DIMER PPP FEU-MCNC: 0.93 UG/ML FEU
EOSINOPHIL # BLD AUTO: 0.48 THOUSAND/ΜL (ref 0–0.61)
EOSINOPHIL NFR BLD AUTO: 8 % (ref 0–6)
ERYTHROCYTE [DISTWIDTH] IN BLOOD BY AUTOMATED COUNT: 17.5 % (ref 11.6–15.1)
FERRITIN SERPL-MCNC: 130 NG/ML (ref 8–388)
GFR SERPL CREATININE-BSD FRML MDRD: 31 ML/MIN/1.73SQ M
GLUCOSE SERPL-MCNC: 104 MG/DL (ref 65–140)
GLUCOSE SERPL-MCNC: 130 MG/DL (ref 65–140)
GLUCOSE SERPL-MCNC: 86 MG/DL (ref 65–140)
GLUCOSE SERPL-MCNC: 87 MG/DL (ref 65–140)
GLUCOSE SERPL-MCNC: 90 MG/DL (ref 65–140)
HCT VFR BLD AUTO: 32.6 % (ref 34.8–46.1)
HGB BLD-MCNC: 10.7 G/DL (ref 11.5–15.4)
IMM GRANULOCYTES # BLD AUTO: 0.02 THOUSAND/UL (ref 0–0.2)
IMM GRANULOCYTES NFR BLD AUTO: 0 % (ref 0–2)
L PNEUMO1 AG UR QL IA.RAPID: NEGATIVE
LYMPHOCYTES # BLD AUTO: 1.8 THOUSANDS/ΜL (ref 0.6–4.47)
LYMPHOCYTES NFR BLD AUTO: 28 % (ref 14–44)
MCH RBC QN AUTO: 29.4 PG (ref 26.8–34.3)
MCHC RBC AUTO-ENTMCNC: 32.8 G/DL (ref 31.4–37.4)
MCV RBC AUTO: 90 FL (ref 82–98)
MONOCYTES # BLD AUTO: 0.59 THOUSAND/ΜL (ref 0.17–1.22)
MONOCYTES NFR BLD AUTO: 9 % (ref 4–12)
NEUTROPHILS # BLD AUTO: 3.42 THOUSANDS/ΜL (ref 1.85–7.62)
NEUTS SEG NFR BLD AUTO: 54 % (ref 43–75)
NRBC BLD AUTO-RTO: 0 /100 WBCS
PLATELET # BLD AUTO: 195 THOUSANDS/UL (ref 149–390)
PMV BLD AUTO: 12 FL (ref 8.9–12.7)
POTASSIUM SERPL-SCNC: 3.6 MMOL/L (ref 3.5–5.3)
PROCALCITONIN SERPL-MCNC: 0.18 NG/ML
PROT SERPL-MCNC: 6.8 G/DL (ref 6.4–8.2)
RBC # BLD AUTO: 3.64 MILLION/UL (ref 3.81–5.12)
S PNEUM AG UR QL: NEGATIVE
SARS-COV-2 RNA RESP QL NAA+PROBE: NEGATIVE
SODIUM SERPL-SCNC: 137 MMOL/L (ref 136–145)
TROPONIN I SERPL-MCNC: <0.02 NG/ML
WBC # BLD AUTO: 6.35 THOUSAND/UL (ref 4.31–10.16)

## 2020-07-25 PROCEDURE — 84484 ASSAY OF TROPONIN QUANT: CPT | Performed by: NURSE PRACTITIONER

## 2020-07-25 PROCEDURE — 82948 REAGENT STRIP/BLOOD GLUCOSE: CPT

## 2020-07-25 PROCEDURE — 87449 NOS EACH ORGANISM AG IA: CPT | Performed by: NURSE PRACTITIONER

## 2020-07-25 PROCEDURE — 99255 IP/OBS CONSLTJ NEW/EST HI 80: CPT | Performed by: INTERNAL MEDICINE

## 2020-07-25 PROCEDURE — 82728 ASSAY OF FERRITIN: CPT | Performed by: NURSE PRACTITIONER

## 2020-07-25 PROCEDURE — 86038 ANTINUCLEAR ANTIBODIES: CPT | Performed by: INTERNAL MEDICINE

## 2020-07-25 PROCEDURE — 99233 SBSQ HOSP IP/OBS HIGH 50: CPT | Performed by: NURSE PRACTITIONER

## 2020-07-25 PROCEDURE — 86602 ANTINOMYCES ANTIBODY: CPT | Performed by: INTERNAL MEDICINE

## 2020-07-25 PROCEDURE — 86331 IMMUNODIFFUSION OUCHTERLONY: CPT | Performed by: INTERNAL MEDICINE

## 2020-07-25 PROCEDURE — 71250 CT THORAX DX C-: CPT

## 2020-07-25 PROCEDURE — 85379 FIBRIN DEGRADATION QUANT: CPT | Performed by: NURSE PRACTITIONER

## 2020-07-25 PROCEDURE — 86255 FLUORESCENT ANTIBODY SCREEN: CPT | Performed by: INTERNAL MEDICINE

## 2020-07-25 PROCEDURE — 86140 C-REACTIVE PROTEIN: CPT | Performed by: NURSE PRACTITIONER

## 2020-07-25 PROCEDURE — 83520 IMMUNOASSAY QUANT NOS NONAB: CPT | Performed by: INTERNAL MEDICINE

## 2020-07-25 PROCEDURE — 80053 COMPREHEN METABOLIC PANEL: CPT | Performed by: NURSE PRACTITIONER

## 2020-07-25 PROCEDURE — 85025 COMPLETE CBC W/AUTO DIFF WBC: CPT | Performed by: NURSE PRACTITIONER

## 2020-07-25 PROCEDURE — 87581 M.PNEUMON DNA AMP PROBE: CPT | Performed by: INTERNAL MEDICINE

## 2020-07-25 PROCEDURE — 87633 RESP VIRUS 12-25 TARGETS: CPT | Performed by: INTERNAL MEDICINE

## 2020-07-25 PROCEDURE — 87486 CHLMYD PNEUM DNA AMP PROBE: CPT | Performed by: INTERNAL MEDICINE

## 2020-07-25 PROCEDURE — 87635 SARS-COV-2 COVID-19 AMP PRB: CPT | Performed by: NURSE PRACTITIONER

## 2020-07-25 PROCEDURE — 87798 DETECT AGENT NOS DNA AMP: CPT | Performed by: INTERNAL MEDICINE

## 2020-07-25 PROCEDURE — 86606 ASPERGILLUS ANTIBODY: CPT | Performed by: INTERNAL MEDICINE

## 2020-07-25 PROCEDURE — 86671 FUNGUS NES ANTIBODY: CPT | Performed by: INTERNAL MEDICINE

## 2020-07-25 RX ORDER — ATORVASTATIN CALCIUM 40 MG/1
40 TABLET, FILM COATED ORAL
Status: DISCONTINUED | OUTPATIENT
Start: 2020-07-25 | End: 2020-07-26 | Stop reason: HOSPADM

## 2020-07-25 RX ORDER — CEFTRIAXONE 1 G/50ML
1000 INJECTION, SOLUTION INTRAVENOUS EVERY 24 HOURS
Status: DISCONTINUED | OUTPATIENT
Start: 2020-07-25 | End: 2020-07-25

## 2020-07-25 RX ORDER — PRAMIPEXOLE DIHYDROCHLORIDE 0.5 MG/1
0.5 TABLET ORAL DAILY PRN
Status: DISCONTINUED | OUTPATIENT
Start: 2020-07-25 | End: 2020-07-26 | Stop reason: HOSPADM

## 2020-07-25 RX ORDER — AMLODIPINE BESYLATE 5 MG/1
5 TABLET ORAL ONCE
Status: COMPLETED | OUTPATIENT
Start: 2020-07-25 | End: 2020-07-25

## 2020-07-25 RX ORDER — LOSARTAN POTASSIUM 25 MG/1
25 TABLET ORAL DAILY
Status: DISCONTINUED | OUTPATIENT
Start: 2020-07-25 | End: 2020-07-26 | Stop reason: HOSPADM

## 2020-07-25 RX ORDER — LABETALOL 20 MG/4 ML (5 MG/ML) INTRAVENOUS SYRINGE
10 EVERY 6 HOURS PRN
Status: DISCONTINUED | OUTPATIENT
Start: 2020-07-25 | End: 2020-07-26 | Stop reason: HOSPADM

## 2020-07-25 RX ADMIN — FLUOXETINE 40 MG: 20 CAPSULE ORAL at 09:28

## 2020-07-25 RX ADMIN — SUCRALFATE 1 G: 1 TABLET ORAL at 17:05

## 2020-07-25 RX ADMIN — PANTOPRAZOLE SODIUM 40 MG: 40 TABLET, DELAYED RELEASE ORAL at 09:31

## 2020-07-25 RX ADMIN — BUSPIRONE HYDROCHLORIDE 15 MG: 10 TABLET ORAL at 09:28

## 2020-07-25 RX ADMIN — CEFTRIAXONE 1000 MG: 1 INJECTION, SOLUTION INTRAVENOUS at 12:54

## 2020-07-25 RX ADMIN — Medication 250 MG: at 09:31

## 2020-07-25 RX ADMIN — SUCRALFATE 1 G: 1 TABLET ORAL at 09:31

## 2020-07-25 RX ADMIN — HEPARIN SODIUM 5000 UNITS: 5000 INJECTION INTRAVENOUS; SUBCUTANEOUS at 13:00

## 2020-07-25 RX ADMIN — HEPARIN SODIUM 5000 UNITS: 5000 INJECTION INTRAVENOUS; SUBCUTANEOUS at 06:20

## 2020-07-25 RX ADMIN — LORATADINE 10 MG: 10 TABLET ORAL at 09:30

## 2020-07-25 RX ADMIN — HEPARIN SODIUM 5000 UNITS: 5000 INJECTION INTRAVENOUS; SUBCUTANEOUS at 21:31

## 2020-07-25 RX ADMIN — SUCRALFATE 1 G: 1 TABLET ORAL at 21:31

## 2020-07-25 RX ADMIN — DOXYCYCLINE 100 MG: 100 CAPSULE ORAL at 21:31

## 2020-07-25 RX ADMIN — AMLODIPINE BESYLATE 5 MG: 5 TABLET ORAL at 00:35

## 2020-07-25 RX ADMIN — OXYCODONE HYDROCHLORIDE AND ACETAMINOPHEN 1000 MG: 500 TABLET ORAL at 09:27

## 2020-07-25 RX ADMIN — DOXYCYCLINE 100 MG: 100 CAPSULE ORAL at 09:28

## 2020-07-25 RX ADMIN — OXYCODONE HYDROCHLORIDE AND ACETAMINOPHEN 1000 MG: 500 TABLET ORAL at 17:05

## 2020-07-25 RX ADMIN — LABETALOL 20 MG/4 ML (5 MG/ML) INTRAVENOUS SYRINGE 10 MG: at 19:48

## 2020-07-25 RX ADMIN — VITAMIN D, TAB 1000IU (100/BT) 2000 UNITS: 25 TAB at 09:28

## 2020-07-25 RX ADMIN — ACETAMINOPHEN 650 MG: 325 TABLET, FILM COATED ORAL at 19:48

## 2020-07-25 RX ADMIN — FLUOXETINE 40 MG: 20 CAPSULE ORAL at 17:05

## 2020-07-25 RX ADMIN — ATORVASTATIN CALCIUM 40 MG: 40 TABLET, FILM COATED ORAL at 21:31

## 2020-07-25 RX ADMIN — SUCRALFATE 1 G: 1 TABLET ORAL at 13:01

## 2020-07-25 RX ADMIN — Medication 250 MG: at 17:05

## 2020-07-25 RX ADMIN — LOSARTAN POTASSIUM 25 MG: 25 TABLET, FILM COATED ORAL at 12:53

## 2020-07-25 RX ADMIN — ZINC SULFATE 220 MG (50 MG) CAPSULE 220 MG: CAPSULE at 09:33

## 2020-07-25 RX ADMIN — FELODIPINE 5 MG: 2.5 TABLET, FILM COATED, EXTENDED RELEASE ORAL at 22:41

## 2020-07-25 RX ADMIN — ARIPIPRAZOLE 15 MG: 5 TABLET ORAL at 09:27

## 2020-07-25 RX ADMIN — BUSPIRONE HYDROCHLORIDE 15 MG: 10 TABLET ORAL at 17:05

## 2020-07-25 NOTE — PLAN OF CARE
Problem: Potential for Falls  Goal: Patient will remain free of falls  Description  INTERVENTIONS:  - Assess patient frequently for physical needs  -  Identify cognitive and physical deficits and behaviors that affect risk of falls    -  [place] fall precautions as indicated by assessment   - Educate patient/family on patient safety including physical limitations  - Instruct patient to call for assistance with activity based on assessment  - Modify environment to reduce risk of injury  - Consider OT/PT consult to assist with strengthening/mobility  Outcome: Progressing     Problem: RESPIRATORY - ADULT  Goal: Achieves optimal ventilation and oxygenation  Description  INTERVENTIONS:  - Assess for changes in respiratory status  - Assess for changes in mentation and behavior  - Position to facilitate oxygenation and minimize respiratory effort  - Oxygen administered by appropriate delivery if ordered  - Initiate smoking cessation education as indicated  - Encourage broncho-pulmonary hygiene including cough, deep breathe, Incentive Spirometry  - Assess the need for suctioning and aspirate as needed  - Assess and instruct to report SOB or any respiratory difficulty  - Respiratory Therapy support as indicated  Outcome: Progressing

## 2020-07-25 NOTE — PROGRESS NOTES
Progress Note - Eleanor Sepulveda 1955, 75 y o  female MRN: [id number]    Unit/Bed#: [address] [place] [address] Encounter: [id number]    Primary Care Provider: No primary care provider on file  Date and time admitted to hospital: 7/24/2020  2:59 PM        * Suspected Covid-19 Virus Infection  Assessment & Plan  Patient is visiting from [place]  Arrived in plane 5 days ago,SOB/subjective fever started 4 days ago  COVID-19 testing was negative in ED  Chest x-ray showed bilateral ground-glass opacities  CT chest - Multifocal, multi lobar groundglass infiltrates which appear to have slightly progressed from the prior study  High confidence level for COVID-19  Patient's O2 saturation was low 90s at rest but dropping to high 80s with exertion in ED  Currently on room, satting low 90s at rest   Patient's type is O positive  Troponin x3 negative  Total CK normal  ProBNP 1035  Initial Procalcitonin 0 26  WBC normal, no bands  Trend inflammatory markers   IL 6 level pending  Blood cultures x2 pending  Check sputum Gram stain and culture if able  Check urine antigens  Started patient on vitamin D3 2000 units daily, vitamin-C 1 g b i d , zinc 220mg p o  Daily, Lipitor 40 mg p o  HS  Patient received IV Zithromax in ED  Started IV Rocephin and doxycycline b i d   ProAir p r n    Out of bed and ambulate  Incentive spirometer  Repeat COVID-19 PCR  Maintain contact and airborne precaution  Pending pulmonary eval     Results from last 7 days   Lab Units 07/24/20  1534   SARS-COV-2  Negative     Results from last 7 days   Lab Units 07/25/20  0618 07/24/20  1607 07/24/20  1606   CRP mg/L 164 2* 180 0*  --    FERRITIN ng/mL  --  115  --    D-DIMER QUANTITATIVE ug/ml FEU 0 93*  --  1 06*      Results from last 7 days   Lab Units 07/24/20  1607   PROCALCITONIN ng/ml 0 26*              SOB (shortness of breath)  Assessment & Plan  Patient present to the ED with shortness of breath for 3 days  Patient is visiting from [place] and arrived to [place] on 17th  Patient does have fever with some chills  Patient has cough with mucus production  In the ED patient had low-grade fever and tachypnea  O2 saturation was in low 90s but dropped to high 80s with exertion  COVID-19 testing was negative  Management as above  HTN (hypertension)  Assessment & Plan  Patient is on hydrochlorothiazide will be held at the current time  Continue losartan, pelodipine  BP labile  Monitor    Chronic diastolic congestive heart failure (HCC)  Assessment & Plan  Wt Readings from Last 3 Encounters:   07/25/20 62 4 kg (137 lb 9 1 oz)     2D echo in 2017 showed EF 63%,G1DD  ProBNP 1035  No signs of fluid overload on exam or imaging  Monitor daily weight  Continue losartan        T2DM (type 2 diabetes mellitus) ([facility] [address] )  Assessment & Plan  No results found for: HGBA1C    Recent Labs     07/24/20  2126 07/25/20  0713 07/25/20  1113   POCGLU 102 90 86       Blood Sugar Average: Last 72 hrs:  Patient is on metformin which will be held due to elevated creatinine  Patient will be on Humalog sliding scale with Accu-Cheks q a c   And HS  Check A1c  (P) [de-identified]      Gastritis  Assessment & Plan  Continue Protonix and Carafate  History of rectal bleeding    CAD (coronary artery disease)  Assessment & Plan  Patient follows up with cardiology, cardiology recommended outpatient nuclear stress test  No symptoms of angina at the current time  Troponin x3 negative      CKD (chronic kidney disease) stage 3, GFR 30-59 ml/min ([facility])  Assessment & Plan  Patient's baseline creatinine appears to be around 1 5-1 9 recently  Creatinine slightly above baseline  Received gentle IV hydration  Creatinine improving  Hold metformin for now  Avoid nephrotoxic agents and hypotension  Hold hydrochlorothiazide    Results from last 7 days   Lab Units 07/25/20  0618 07/24/20  1606   BUN mg/dL 33* 39*   CREATININE mg/dL 1 69* 1 99*               VTE Pharmacologic Prophylaxis:   Pharmacologic: Heparin  Mechanical VTE Prophylaxis in Place: Yes    Patient Centered Rounds: I have performed bedside rounds with nursing staff today  Discussions with Specialists or Other Care Team Provider: attending    Education and Discussions with Family / Patient:  Yes    Time Spent for Care: 30 minutes  More than 50% of total time spent on counseling and coordination of care as described above  Current Length of Stay: 1 day(s)    Current Patient Status: Inpatient   Certification Statement: The patient will continue to require additional inpatient hospital stay due to Suspect COVID-19 infection    Discharge Plan:  Pending progress    Code Status: Level 1 - Full Code      Subjective:   Patient denies SOB, cough, chest pain, headache, dizziness, nausea, vomiting, diarrhea, constipation, fever or chills  Reports walking to bathroom without feeling SOB today  Objective:     Vitals:   Temp (24hrs), Av 8 °F (37 1 °C), Min:98 7 °F (37 1 °C), Max:99 1 °F (37 3 °C)    Temp:  [98 7 °F (37 1 °C)-99 1 °F (37 3 °C)] 98 7 °F (37 1 °C)  HR:  [82-99] 93  Resp:  [20-24] 20  BP: (143-191)/(67-96) 168/88  SpO2:  [89 %-98 %] 92 %  Body mass index is 27 79 kg/m²  Input and Output Summary (last 24 hours): Intake/Output Summary (Last 24 hours) at 2020 1157  Last data filed at 2020 0800  Gross per 24 hour   Intake 1360 ml   Output    Net 1360 ml       Physical Exam:     Physical Exam   Constitutional: She is oriented to person, place, and time  She appears well-developed and well-nourished  HENT:   Head: Normocephalic and atraumatic  Neck: Normal range of motion  Neck supple  No JVD present  No tracheal deviation present  No thyromegaly present  Cardiovascular: Normal rate and regular rhythm  No murmur heard  Pulmonary/Chest: Effort normal  No respiratory distress  She has no wheezes  She has no rales  Breath sounds clear on auscultation  Patient room air, satting low 90s at rest    Abdominal: Soft  Bowel sounds are normal  She exhibits no distension  There is no tenderness  There is no guarding  Musculoskeletal: Normal range of motion  She exhibits no edema, tenderness or deformity  Neurological: She is alert and oriented to person, place, and time  Skin: Skin is warm and dry  Psychiatric: She has a normal mood and affect  Judgment normal    Nursing note and vitals reviewed  Additional Data:     Labs:    Results from last 7 days   Lab Units 07/25/20  0618   WBC Thousand/uL 6 35   HEMOGLOBIN g/dL 10 7*   HEMATOCRIT % 32 6*   PLATELETS Thousands/uL 195   NEUTROS PCT % 54   LYMPHS PCT % 28   MONOS PCT % 9   EOS PCT % 8*     Results from last 7 days   Lab Units 07/25/20  0618   POTASSIUM mmol/L 3 6   CHLORIDE mmol/L 105   CO2 mmol/L 22   BUN mg/dL 33*   CREATININE mg/dL 1 69*   CALCIUM mg/dL 7 9*   ALK PHOS U/L 117*   ALT U/L 31   AST U/L 23     Results from last 7 days   Lab Units 07/24/20  1606   INR  0 97       * I Have Reviewed All Lab Data Listed Above  * Additional Pertinent Lab Tests Reviewed: [facility] Reviewed    Imaging:    Imaging Reports Reviewed Today Include:  Chest x-ray, CT chest, 2D echo  Imaging Personally Reviewed by Myself Includes:  Chest x-ray    Recent Cultures (last 7 days):     Results from last 7 days   Lab Units 07/24/20  1538 07/24/20  1534   BLOOD CULTURE  Received in Microbiology Lab  Culture in Progress  Received in Microbiology Lab  Culture in Progress         Last 24 Hours Medication List:     Current Facility-Administered Medications:  acetaminophen 650 mg Oral Q6H PRN ALLISON Gates    albuterol 2 puff Inhalation Q4H PRN ALLISON Gates    ARIPiprazole 15 mg Oral Daily Raul Sky MD    ascorbic acid 1,000 mg Oral BID ALLISON Gates    atorvastatin 40 mg Oral HS ALLISON Gates    busPIRone 15 mg Oral BID Lorri Lim MD    cefTRIAXone 1,000 mg Intravenous Q24H ALLISON Gates    cholecalciferol 2,000 Units Oral Daily ALLISON Gates    doxycycline hyclate 100 mg Oral Q12H [address] ALLISON Gates    felodipine 5 mg Oral HS Bairon Kelsy, ALLISON    FLUoxetine 40 mg Oral BID Saurabh Mojica MD    heparin (porcine) 5,000 Units Subcutaneous Q8H [address] Saurabh Mojica MD    insulin lispro 1-5 Units Subcutaneous TID AC Saurabh Mojica MD    insulin lispro 1-5 Units Subcutaneous HS Saurabh Mojica MD    loratadine 10 mg Oral Daily ALLISON Gates    losartan 25 mg Oral Daily Thu Will, ALLISON    nicotine 1 patch Transdermal Daily ALLISON Gates    ondansetron 4 mg Intravenous Q6H PRN Thu Will, ALLISON    pantoprazole 40 mg Oral Daily Saurabh Mojica MD    pramipexole 0 5 mg Oral Daily PRN Thu Will, ALLISON    saccharomyces boulardii 250 mg Oral BID ALLISON Gates    sodium chloride 75 mL/hr Intravenous Continuous Saurabh Mojica MD Last Rate: 75 mL/hr (07/24/20 2054)   sucralfate 1 g Oral 4x Daily Saurabh Mojica MD    zinc sulfate 220 mg Oral Daily ALLISON Taylor         Today, Patient Was Seen By: ALLISON Jeter    ** Please Note: Dragon 360 Dictation voice to text software may have been used in the creation of this document   **

## 2020-07-25 NOTE — ASSESSMENT & PLAN NOTE
Patient present to the ED with shortness of breath for 3 days  Patient is visiting from [place] and arrived to [place] on 17th  Patient does have fever with some chills  Patient has cough with mucus production  In the ED patient had low-grade fever and tachypnea  O2 saturation was in low 90s but dropped to high 80s with exertion  COVID-19 testing was negative  Management as above

## 2020-07-25 NOTE — ASSESSMENT & PLAN NOTE
Patient's baseline creatinine appears to be around 1 5-1 9 recently  Creatinine slightly above baseline  Received gentle IV hydration  Creatinine improving  Hold metformin for now  Avoid nephrotoxic agents and hypotension  Hold hydrochlorothiazide    Results from last 7 days   Lab Units 07/25/20 0618 07/24/20  1606   BUN mg/dL 33* 39*   CREATININE mg/dL 1 69* 1 99*

## 2020-07-25 NOTE — ASSESSMENT & PLAN NOTE
Wt Readings from Last 3 Encounters:   07/25/20 62 4 kg (137 lb 9 1 oz)     2D echo in 2017 showed EF 63%,G1DD  ProBNP 1035  No signs of fluid overload on exam or imaging  Monitor daily weight    Continue losartan

## 2020-07-25 NOTE — PLAN OF CARE
Problem: Potential for Falls  Goal: Patient will remain free of falls  Description  INTERVENTIONS:  - Assess patient frequently for physical needs  -  Identify cognitive and physical deficits and behaviors that affect risk of falls    -  [place] fall precautions as indicated by assessment   - Educate patient/family on patient safety including physical limitations  - Instruct patient to call for assistance with activity based on assessment  - Modify environment to reduce risk of injury  - Consider OT/PT consult to assist with strengthening/mobility  Outcome: Progressing     Problem: RESPIRATORY - ADULT  Goal: Achieves optimal ventilation and oxygenation  Description  INTERVENTIONS:  - Assess for changes in respiratory status  - Assess for changes in mentation and behavior  - Position to facilitate oxygenation and minimize respiratory effort  - Oxygen administered by appropriate delivery if ordered  - Initiate smoking cessation education as indicated  - Encourage broncho-pulmonary hygiene including cough, deep breathe, Incentive Spirometry  - Assess the need for suctioning and aspirate as needed  - Assess and instruct to report SOB or any respiratory difficulty  - Respiratory Therapy support as indicated  Outcome: Progressing

## 2020-07-25 NOTE — ASSESSMENT & PLAN NOTE
Patient is on hydrochlorothiazide will be held at the current time  Continue losartan, pelodipine    BP labile  Monitor

## 2020-07-25 NOTE — CONSULTS
Consultation - Pulmonary Medicine   Teddy Rankin 75 y o  female MRN: [id number]  Unit/Bed#: [address] [place] [address] Encounter: [id number]      Physician Requesting Consult: Dr Alex Albert  Reason for Consult: Dyspnea, pneumonia, suspected COVID19 infection    Assessment/Plan:    1  Pneumonia; abnormal CT chest  · Unclear etiology of diffuse ground-glass opacities  Consider atypical or viral infection  Also consider vaping injury, hypersensitivity, autoimmune lung disease, etc   · Check RP2 panel  · Repeat COVID19 testing tomorrow  · Check autoimmune serologies and hypersensitivity panel  · May need eventual bronchoscopy  · If RP2 and COVID19 testing negative, consider empiric steroids  · Procalcitonin negative x 2; d/c Ceftriaxone  2  Suspected COVID19 infection  · Two negative test results noted  · Recommend repeat test in AM  · Keep on isolation and in negative pressure room for now  3  Tobacco dependence syndrome  1  Encourage cessation  4  CAD; hypertension  1  Cont  Statin and o/p anti hypertensives  2  Cont  [place]  5  Diabetes  1  Accuchecks with ISS  2  Monitor bs closely if steroids are initiated  6  CKD stage 3  1  Creatinine reported at baseline  2  Renally dose medications as appropriate  7  Overweight due to excess calorie intake with BMI 27 79  1  Weight loss    Thanks for this interesting consult  I will follow along with you  I discussed the plan of care with the patient in detail   ______________________________________________________________________    HPI:    Teddy Rankin is a 75 y o  female who presents with ongoing shortness of breath  She is visiting from [place] and states that for the past 5 days she has had shortness of breath and subjective fevers  She has a dry cough when she smokes  She denies nausea vomiting or diarrhea  Her breathlessness is worse with exertion  In the emergency room, her chest x-ray showed patchy bilateral ground-glass opacities    Her labs were significant for normal white blood cell count with lymphopenia, elevated creatinine, elevated alkaline phosphatase, hypoalbuminemia, elevated BNP  She was admitted to the floors  A CT scan of her chest revealed diffuse ground-glass opacities  We are consulted to help with management  Found lying in bed  Awake and in no distress  On room air  Reports a dry cough with smoking  She has been ongoing for the past week  Reports breathlessness on exertion  Has no known history of lung disease  Does not use home inhalers  She is a lifelong smoker, smoking 1/2 pack per day since her teens  She had PFT testing in the past but states it was normal       PFT results:  None on record    Review of Systems:  Full 12 point review of systems was performed  Aside from what was mentioned in the HPI, it is otherwise negative  Historical Information   Past Medical History:   Diagnosis Date    CAD (coronary artery disease)     Carotid arterial disease (HCC)     CKD (chronic kidney disease) stage 3, GFR 30-59 ml/min (HCC)     Gastritis     HTN (hypertension)     T2DM (type 2 diabetes mellitus) ([facility])      Past Surgical History:   Procedure Laterality Date    ABDOMINAL HERNIA REPAIR      APPENDECTOMY      BACK SURGERY      CHOLECYSTECTOMY      TOTAL HIP ARTHROPLASTY       Social History   Social History     Substance and Sexual Activity   Alcohol Use Not Currently     Social History     Tobacco Use   Smoking Status Current Every Day Smoker    Packs/day: 0 50    Years: 40 00    Pack years: 20 00    Types: Cigarettes   Smokeless Tobacco Never Used     Lives in an assisted living facility in [place]    Family History:   Family History   Problem Relation Age of Onset    Diabetes Mother     Hypertension Mother     COPD Father     Hypertension Father        Medications: The patient's active and prehospital medications were reviewed      Current Facility-Administered Medications:  acetaminophen 650 mg Oral Q6H PRN Cuiyin Rudyk, CRTROY    albuterol 2 puff Inhalation Q4H PRN Cuiwilberto Bartholomewrik, CRNP    ARIPiprazole 15 mg Oral Daily Lorri Lim MD    ascorbic acid 1,000 mg Oral BID Cuiwilberto Bartholomewrik, CRNP    atorvastatin 40 mg Oral HS Cuiyifelice Bartholomewrik, CRNP    busPIRone 15 mg Oral BID Lorri Lim MD    cefTRIAXone 1,000 mg Intravenous Q24H [place] Will, ALLISON Last Rate: 1,000 mg (07/25/20 1254)   cholecalciferol 2,000 Units Oral Daily Cuiwilberto Will, CRTROY    doxycycline hyclate 100 mg Oral Q12H [address] Thu Will, ALLISON    felodipine 5 mg Oral HS [facility], CRNP    FLUoxetine 40 mg Oral BID Nita Arciniega MD    heparin (porcine) 5,000 Units Subcutaneous Q8H [address] Nita Arciniega MD    insulin lispro 1-5 Units Subcutaneous TID AC Nita Arciniega MD    insulin lispro 1-5 Units Subcutaneous HS Nita Arciniega MD    loratadine 10 mg Oral Daily Thu Will, CRNP    losartan 25 mg Oral Daily Thu Will, ALLISON    nicotine 1 patch Transdermal Daily Thu Will, CRTROY    ondansetron 4 mg Intravenous Q6H PRN Thu Will, CRNP    pantoprazole 40 mg Oral Daily Nita rAciniega MD    pramipexole 0 5 mg Oral Daily PRN Thu Will, CRNP    saccharomyces boulardii 250 mg Oral BID Cuiwilberto Will, ALLISON    sodium chloride 75 mL/hr Intravenous Continuous Nita Arciniega MD Last Rate: 75 mL/hr (07/24/20 2054)   sucralfate 1 g Oral 4x Daily Nita Arciniega MD    zinc sulfate 220 mg Oral Daily ALLISON Gates          PhysicalExamination:  Vitals:   Vitals:    07/25/20 0112 07/25/20 0542 07/25/20 0900 07/25/20 0931   BP: 143/73  (!) 191/88 168/88   BP Location:   Left arm    Pulse: 83  93    Resp:   20    Temp:   98 7 °F (37 1 °C)    TempSrc:   Oral    SpO2: 94%  92%    Weight:  62 4 kg (137 lb 9 1 oz)     Height:         Body mass index is 27 79 kg/m²    Temp  Min: 98 7 °F (37 1 °C)  Max: 99 1 °F (37 3 °C)  IBW: 43 2 kg    SpO2: 92 %,   SpO2 Activity: At Rest,   O2 Device: None (Room air)    GEN: WDWN, nad, comfortable, speaking in full sentences, no accessory muscle use  HEENT: NCAT, EOMI, MMM  CVS: Regular  LUNGS: coarse b/l bs, no wheezing  ABD: soft, nd  EXT: No c/c/e  NEURO: No focal deficits  MS: Moving all extremities  SKIN: warm, dry  PSYCH: calm, cooperative      Diagnostic Data:  CBC:   Results from last 7 days   Lab Units 07/25/20  0618 07/24/20  2143 07/24/20  1534   WBC Thousand/uL 6 35  --  8 70   HEMOGLOBIN g/dL 10 7*  --  11 6   HEMATOCRIT % 32 6*  --  35 0   PLATELETS Thousands/uL 195 175 187       CMP:   Results from last 7 days   Lab Units 07/25/20  0618 07/24/20  1606   SODIUM mmol/L 137 138   POTASSIUM mmol/L 3 6 3 9   CHLORIDE mmol/L 105 101   CO2 mmol/L 22 27   BUN mg/dL 33* 39*   CREATININE mg/dL 1 69* 1 99*   CALCIUM mg/dL 7 9* 8 6   ALK PHOS U/L 117* 129*   ALT U/L 31 40   AST U/L 23 28         Microbiology:  Results from last 7 days   Lab Units 07/24/20  1538 07/24/20  1534   BLOOD CULTURE  Received in Microbiology Lab  Culture in Progress  Received in Microbiology Lab  Culture in Progress         Imaging:  CT chest reviewed by me personally diffuse ground-glass opacities bilaterally    Cardiac lab/EKG/telemetry/ECHO:   Results from last 7 days   Lab Units 07/25/20  0618 07/24/20  2143 07/24/20  1606   TROPONIN I ng/mL <0 02 <0 02 <0 02   NT-PRO BNP pg/mL  --   --  1,035*     Kirsten Vivar DO

## 2020-07-25 NOTE — ASSESSMENT & PLAN NOTE
Patient follows up with cardiology, cardiology recommended outpatient nuclear stress test  No symptoms of angina at the current time  Troponin x3 negative

## 2020-07-25 NOTE — ASSESSMENT & PLAN NOTE
Patient is visiting from [place]  Arrived in plane 5 days ago,SOB/subjective fever started 4 days ago  COVID-19 testing was negative in ED  Chest x-ray showed bilateral ground-glass opacities  CT chest - Multifocal, multi lobar groundglass infiltrates which appear to have slightly progressed from the prior study  High confidence level for COVID-19  Patient's O2 saturation was low 90s at rest but dropping to high 80s with exertion in ED  Currently on room, satting low 90s at rest   Patient's type is O positive  Troponin x3 negative  Total CK normal  ProBNP 1035  Initial Procalcitonin 0 26  WBC normal, no bands  Trend inflammatory markers   IL 6 level pending  Blood cultures x2 pending  Check sputum Gram stain and culture if able  Check urine antigens  Started patient on vitamin D3 2000 units daily, vitamin-C 1 g b i d , zinc 220mg p o  Daily, Lipitor 40 mg p o  HS  Patient received IV Zithromax in ED  Started IV Rocephin and doxycycline b i d   ProAir p r n    Out of bed and ambulate  Incentive spirometer  Repeat COVID-19 PCR  Maintain contact and airborne precaution  Pending pulmonary eval     Results from last 7 days   Lab Units 07/24/20  1534   SARS-COV-2  Negative     Results from last 7 days   Lab Units 07/25/20  0618 07/24/20  1607 07/24/20  1606   CRP mg/L 164 2* 180 0*  --    FERRITIN ng/mL  --  115  --    D-DIMER QUANTITATIVE ug/ml FEU 0 93*  --  1 06*      Results from last 7 days   Lab Units 07/24/20  1607   PROCALCITONIN ng/ml 0 26*

## 2020-07-25 NOTE — ASSESSMENT & PLAN NOTE
No results found for: HGBA1C    Recent Labs     07/24/20 2126 07/25/20  0713 07/25/20  1113   POCGLU 102 90 86       Blood Sugar Average: Last 72 hrs:  Patient is on metformin which will be held due to elevated creatinine  Patient will be on Humalog sliding scale with Accu-Cheks q a c   And HS  Check A1c  (P) [de-identified]

## 2020-07-26 ENCOUNTER — APPOINTMENT (INPATIENT)
Dept: RADIOLOGY | Facility: HOSPITAL | Age: 65
DRG: 194 | End: 2020-07-26
Payer: MEDICARE

## 2020-07-26 VITALS
OXYGEN SATURATION: 95 % | HEIGHT: 59 IN | SYSTOLIC BLOOD PRESSURE: 168 MMHG | HEART RATE: 85 BPM | WEIGHT: 134.04 LBS | RESPIRATION RATE: 16 BRPM | BODY MASS INDEX: 27.02 KG/M2 | TEMPERATURE: 98.1 F | DIASTOLIC BLOOD PRESSURE: 95 MMHG

## 2020-07-26 PROBLEM — R06.02 SOB (SHORTNESS OF BREATH): Status: RESOLVED | Noted: 2020-07-24 | Resolved: 2020-07-26

## 2020-07-26 LAB
ALBUMIN SERPL BCP-MCNC: 2.4 G/DL (ref 3.5–5)
ALP SERPL-CCNC: 122 U/L (ref 46–116)
ALT SERPL W P-5'-P-CCNC: 27 U/L (ref 12–78)
ANION GAP SERPL CALCULATED.3IONS-SCNC: 13 MMOL/L (ref 4–13)
AST SERPL W P-5'-P-CCNC: 24 U/L (ref 5–45)
ATRIAL RATE: 88 BPM
B PARAP IS1001 DNA NPH QL NAA+NON-PROBE: NOT DETECTED
B PERT.PT PRMT NPH QL NAA+NON-PROBE: NOT DETECTED
BILIRUB SERPL-MCNC: 0.3 MG/DL (ref 0.2–1)
BUN SERPL-MCNC: 22 MG/DL (ref 5–25)
C PNEUM DNA NPH QL NAA+NON-PROBE: NOT DETECTED
CALCIUM SERPL-MCNC: 8.6 MG/DL (ref 8.3–10.1)
CHLORIDE SERPL-SCNC: 102 MMOL/L (ref 100–108)
CO2 SERPL-SCNC: 22 MMOL/L (ref 21–32)
CREAT SERPL-MCNC: 1.3 MG/DL (ref 0.6–1.3)
CRP SERPL QL: 103.4 MG/L
D DIMER PPP FEU-MCNC: 0.7 UG/ML FEU
ERYTHROCYTE [DISTWIDTH] IN BLOOD BY AUTOMATED COUNT: 17.4 % (ref 11.6–15.1)
FERRITIN SERPL-MCNC: 132 NG/ML (ref 8–388)
FLUAV RNA NPH QL NAA+NON-PROBE: NOT DETECTED
FLUBV RNA NPH QL NAA+NON-PROBE: NOT DETECTED
GFR SERPL CREATININE-BSD FRML MDRD: 43 ML/MIN/1.73SQ M
GLUCOSE SERPL-MCNC: 101 MG/DL (ref 65–140)
GLUCOSE SERPL-MCNC: 103 MG/DL (ref 65–140)
GLUCOSE SERPL-MCNC: 93 MG/DL (ref 65–140)
GLUCOSE SERPL-MCNC: 93 MG/DL (ref 65–140)
HADV DNA NPH QL NAA+NON-PROBE: NOT DETECTED
HCT VFR BLD AUTO: 34.5 % (ref 34.8–46.1)
HGB BLD-MCNC: 11.1 G/DL (ref 11.5–15.4)
HMPV RNA NPH QL NAA+NON-PROBE: NOT DETECTED
HPIV 1+2+3+4 RNA NPH QL NAA+NON-PROBE: NOT DETECTED
HPIV 1+2+3+4 RNA NPH QL NAA+NON-PROBE: NOT DETECTED
M PNEUMO DNA NPH QL NAA+NON-PROBE: NOT DETECTED
MCH RBC QN AUTO: 28.7 PG (ref 26.8–34.3)
MCHC RBC AUTO-ENTMCNC: 32.2 G/DL (ref 31.4–37.4)
MCV RBC AUTO: 89 FL (ref 82–98)
P AXIS: 53 DEGREES
PLATELET # BLD AUTO: 227 THOUSANDS/UL (ref 149–390)
PMV BLD AUTO: 11.5 FL (ref 8.9–12.7)
POTASSIUM SERPL-SCNC: 3.6 MMOL/L (ref 3.5–5.3)
PR INTERVAL: 146 MS
PROCALCITONIN SERPL-MCNC: <0.05 NG/ML
PROT SERPL-MCNC: 7.1 G/DL (ref 6.4–8.2)
QRS AXIS: 2 DEGREES
QRSD INTERVAL: 90 MS
QT INTERVAL: 382 MS
QTC INTERVAL: 462 MS
RBC # BLD AUTO: 3.87 MILLION/UL (ref 3.81–5.12)
RSV RNA NPH QL NAA+NON-PROBE: NOT DETECTED
RV+EV RNA NPH QL NAA+NON-PROBE: NOT DETECTED
SARS-COV-2 RNA RESP QL NAA+PROBE: NEGATIVE
SODIUM SERPL-SCNC: 137 MMOL/L (ref 136–145)
T WAVE AXIS: 16 DEGREES
VENTRICULAR RATE: 88 BPM
WBC # BLD AUTO: 6.06 THOUSAND/UL (ref 4.31–10.16)

## 2020-07-26 PROCEDURE — 82728 ASSAY OF FERRITIN: CPT | Performed by: NURSE PRACTITIONER

## 2020-07-26 PROCEDURE — 93010 ELECTROCARDIOGRAM REPORT: CPT | Performed by: INTERNAL MEDICINE

## 2020-07-26 PROCEDURE — 80053 COMPREHEN METABOLIC PANEL: CPT | Performed by: NURSE PRACTITIONER

## 2020-07-26 PROCEDURE — 71045 X-RAY EXAM CHEST 1 VIEW: CPT

## 2020-07-26 PROCEDURE — 84145 PROCALCITONIN (PCT): CPT | Performed by: NURSE PRACTITIONER

## 2020-07-26 PROCEDURE — 85379 FIBRIN DEGRADATION QUANT: CPT | Performed by: NURSE PRACTITIONER

## 2020-07-26 PROCEDURE — 82948 REAGENT STRIP/BLOOD GLUCOSE: CPT

## 2020-07-26 PROCEDURE — 86140 C-REACTIVE PROTEIN: CPT | Performed by: NURSE PRACTITIONER

## 2020-07-26 PROCEDURE — 85027 COMPLETE CBC AUTOMATED: CPT | Performed by: NURSE PRACTITIONER

## 2020-07-26 PROCEDURE — 99239 HOSP IP/OBS DSCHRG MGMT >30: CPT | Performed by: NURSE PRACTITIONER

## 2020-07-26 PROCEDURE — 99232 SBSQ HOSP IP/OBS MODERATE 35: CPT | Performed by: INTERNAL MEDICINE

## 2020-07-26 PROCEDURE — 87635 SARS-COV-2 COVID-19 AMP PRB: CPT | Performed by: INTERNAL MEDICINE

## 2020-07-26 RX ORDER — FELODIPINE 2.5 MG/1
10 TABLET, EXTENDED RELEASE ORAL
Status: DISCONTINUED | OUTPATIENT
Start: 2020-07-26 | End: 2020-07-26 | Stop reason: HOSPADM

## 2020-07-26 RX ORDER — ATORVASTATIN CALCIUM 40 MG/1
40 TABLET, FILM COATED ORAL
Qty: 14 TABLET | Refills: 0 | Status: SHIPPED | OUTPATIENT
Start: 2020-07-26 | End: 2020-08-09

## 2020-07-26 RX ORDER — FELODIPINE 10 MG/1
10 TABLET, EXTENDED RELEASE ORAL
Qty: 30 TABLET | Refills: 0 | Status: SHIPPED | OUTPATIENT
Start: 2020-07-26 | End: 2020-08-25

## 2020-07-26 RX ORDER — HYDROCHLOROTHIAZIDE 25 MG/1
25 TABLET ORAL DAILY
Status: DISCONTINUED | OUTPATIENT
Start: 2020-07-26 | End: 2020-07-26 | Stop reason: HOSPADM

## 2020-07-26 RX ORDER — MELATONIN
2000 DAILY
Qty: 28 TABLET | Refills: 0 | Status: SHIPPED | OUTPATIENT
Start: 2020-07-27 | End: 2020-08-10

## 2020-07-26 RX ORDER — BUTALBITAL, ACETAMINOPHEN AND CAFFEINE 50; 325; 40 MG/1; MG/1; MG/1
1 TABLET ORAL EVERY 4 HOURS PRN
Status: DISCONTINUED | OUTPATIENT
Start: 2020-07-26 | End: 2020-07-26 | Stop reason: HOSPADM

## 2020-07-26 RX ORDER — LOSARTAN POTASSIUM 25 MG/1
25 TABLET ORAL DAILY
Refills: 0
Start: 2020-07-27 | End: 2020-07-28

## 2020-07-26 RX ORDER — PRAMIPEXOLE DIHYDROCHLORIDE 0.5 MG/1
0.5 TABLET ORAL DAILY PRN
Refills: 0
Start: 2020-07-26 | End: 2020-07-28 | Stop reason: HOSPADM

## 2020-07-26 RX ORDER — LABETALOL 100 MG/1
200 TABLET, FILM COATED ORAL EVERY 12 HOURS SCHEDULED
Status: DISCONTINUED | OUTPATIENT
Start: 2020-07-26 | End: 2020-07-26

## 2020-07-26 RX ORDER — ZINC SULFATE 50(220)MG
220 CAPSULE ORAL DAILY
Qty: 5 CAPSULE | Refills: 0 | Status: SHIPPED | OUTPATIENT
Start: 2020-07-27 | End: 2020-08-01

## 2020-07-26 RX ADMIN — DOXYCYCLINE 100 MG: 100 CAPSULE ORAL at 09:20

## 2020-07-26 RX ADMIN — OXYCODONE HYDROCHLORIDE AND ACETAMINOPHEN 1000 MG: 500 TABLET ORAL at 09:20

## 2020-07-26 RX ADMIN — BUSPIRONE HYDROCHLORIDE 15 MG: 10 TABLET ORAL at 09:20

## 2020-07-26 RX ADMIN — SUCRALFATE 1 G: 1 TABLET ORAL at 18:41

## 2020-07-26 RX ADMIN — HEPARIN SODIUM 5000 UNITS: 5000 INJECTION INTRAVENOUS; SUBCUTANEOUS at 14:57

## 2020-07-26 RX ADMIN — ACETAMINOPHEN 650 MG: 325 TABLET, FILM COATED ORAL at 05:34

## 2020-07-26 RX ADMIN — LORATADINE 10 MG: 10 TABLET ORAL at 09:20

## 2020-07-26 RX ADMIN — Medication 250 MG: at 18:41

## 2020-07-26 RX ADMIN — Medication 250 MG: at 09:20

## 2020-07-26 RX ADMIN — ARIPIPRAZOLE 15 MG: 5 TABLET ORAL at 09:20

## 2020-07-26 RX ADMIN — SUCRALFATE 1 G: 1 TABLET ORAL at 09:20

## 2020-07-26 RX ADMIN — PANTOPRAZOLE SODIUM 40 MG: 40 TABLET, DELAYED RELEASE ORAL at 09:20

## 2020-07-26 RX ADMIN — ZINC SULFATE 220 MG (50 MG) CAPSULE 220 MG: CAPSULE at 09:20

## 2020-07-26 RX ADMIN — HYDROCHLOROTHIAZIDE 25 MG: 25 TABLET ORAL at 09:20

## 2020-07-26 RX ADMIN — VITAMIN D, TAB 1000IU (100/BT) 2000 UNITS: 25 TAB at 09:20

## 2020-07-26 RX ADMIN — FLUOXETINE 40 MG: 20 CAPSULE ORAL at 18:41

## 2020-07-26 RX ADMIN — HEPARIN SODIUM 5000 UNITS: 5000 INJECTION INTRAVENOUS; SUBCUTANEOUS at 05:32

## 2020-07-26 RX ADMIN — FLUOXETINE 40 MG: 20 CAPSULE ORAL at 09:20

## 2020-07-26 RX ADMIN — OXYCODONE HYDROCHLORIDE AND ACETAMINOPHEN 1000 MG: 500 TABLET ORAL at 18:41

## 2020-07-26 RX ADMIN — BUTALBITAL, ACETAMINOPHEN AND CAFFEINE 1 TABLET: 50; 325; 40 TABLET ORAL at 12:34

## 2020-07-26 RX ADMIN — BUSPIRONE HYDROCHLORIDE 15 MG: 10 TABLET ORAL at 18:41

## 2020-07-26 RX ADMIN — LOSARTAN POTASSIUM 25 MG: 25 TABLET, FILM COATED ORAL at 09:20

## 2020-07-26 RX ADMIN — NICOTINE 1 PATCH: 14 PATCH TRANSDERMAL at 09:20

## 2020-07-26 RX ADMIN — SUCRALFATE 1 G: 1 TABLET ORAL at 12:34

## 2020-07-26 NOTE — ASSESSMENT & PLAN NOTE
Patient present to the ED with shortness of breath for 3 days  Patient is visiting from [place] and arrived to [place] on 17th  Patient does have fever with some chills at home  Patient has cough with mucus production at home  In the ED patient had low-grade fever and tachypnea  O2 saturation was in low 90s but dropped to high 80s with exertion  COVID-19 testing x 3 were negative  Management as above    Patient satting low 90s on room air at rest   Satting 90% with ambulation during ambulatory O2 eval

## 2020-07-26 NOTE — UTILIZATION REVIEW
Continued Stay Review    Date: 7/25/20 Sunday                           Current Patient Class: Inpatient    Current Level of Care: Acute Med Surg    HPI:   75year old female resident of [place] visiting daughter in this area - with PMHx HTN, HLD, CAD, carotid disease, DM2, CKD Stage 3, Depression, tobacco dependence  Admitted inpatient 7/24/20 at 1816 2nd SOB with Pneumonia/ suspected COVID -19 infection - Plan: Isolation with negative pressure room, O2 prn, IVF, IV Rocephin and Zithromax, repeat COVID testing, Pulmonary Consult  7/25/20 Pulmonary Consult:  Assessment/Plan:   *room air SpO2 low 90s at rest    Maintain contact and airborne precaution    1  Pneumonia; abnormal CT chest  · Unclear etiology of diffuse ground-glass opacities  Consider atypical or viral infection  Also consider vaping injury, hypersensitivity, autoimmune lung disease, etc   · Check RP2 panel  · Repeat COVID19 testing tomorrow  · Check autoimmune serologies and hypersensitivity panel  · May need eventual bronchoscopy  · If RP2 and COVID19 testing negative, consider empiric steroids  · Procalcitonin negative x 2; d/c Ceftriaxone  2  Suspected COVID19 infection  · Two negative test results noted  · Recommend repeat test in AM  · Keep on isolation and in negative pressure room for now  3  Tobacco dependence syndrome  1   Encourage cessation    Pertinent Labs/Diagnostic Results:   Results from last 7 days   Lab Units 07/25/20  1111 07/24/20  1534   SARS-COV-2  Negative Negative     Results from last 7 days   Lab Units 07/25/20  0618 07/24/20  2143 07/24/20  1534   WBC Thousand/uL 6 35  --  8 70   HEMOGLOBIN g/dL 10 7*  --  11 6   HEMATOCRIT % 32 6*  --  35 0   PLATELETS Thousands/uL 195 175 187   NEUTROS ABS Thousands/µL 3 42  --  6 47     Results from last 7 days   Lab Units 07/25/20  0618 07/24/20  1606   SODIUM mmol/L 137 138   POTASSIUM mmol/L 3 6 3 9   CHLORIDE mmol/L 105 101   CO2 mmol/L 22 27   ANION GAP mmol/L 10 10   BUN mg/dL 33* 39*   CREATININE mg/dL 1 69* 1 99*   EGFR ml/min/1 73sq m 31 26   CALCIUM mg/dL 7 9* 8 6     Results from last 7 days   Lab Units 07/25/20  0618 07/24/20  1606   AST U/L 23 28   ALT U/L 31 40   ALK PHOS U/L 117* 129*   TOTAL PROTEIN g/dL 6 8 7 2   ALBUMIN g/dL 2 3* 2 6*   TOTAL BILIRUBIN mg/dL 0 30 0 30     Results from last 7 days   Lab Units 07/25/20  1625 07/25/20  1113 07/25/20  0713 07/24/20  2126   POC GLUCOSE mg/dl 104 86 90 102     Results from last 7 days   Lab Units 07/25/20  0618 07/24/20  1606   GLUCOSE RANDOM mg/dL 87 107     Results from last 7 days   Lab Units 07/24/20  1607   CK TOTAL U/L 61     Results from last 7 days   Lab Units 07/25/20  0618 07/24/20  2143 07/24/20  1606   TROPONIN I ng/mL <0 02 <0 02 <0 02     Results from last 7 days   Lab Units 07/25/20  0618 07/24/20  1606   D-DIMER QUANTITATIVE ug/ml FEU 0 93* 1 06*     Results from last 7 days   Lab Units 07/24/20  1606   PROTIME seconds 12 8   INR  0 97   PTT seconds 38*     Results from last 7 days   Lab Units 07/24/20  2143 07/24/20  1607   PROCALCITONIN ng/ml 0 18 0 26*     Results from last 7 days   Lab Units 07/24/20  1534   LACTIC ACID mmol/L 0 9     Results from last 7 days   Lab Units 07/24/20  1606   NT-PRO BNP pg/mL 1,035*     Results from last 7 days   Lab Units 07/25/20  0618 07/24/20  1607   FERRITIN ng/mL 130 115     Results from last 7 days   Lab Units 07/25/20  0618 07/24/20  1607   CRP mg/L 164 2* 180 0*     Results from last 7 days   Lab Units 07/24/20  1813   CLARITY UA  Slightly Cloudy   COLOR UA  Yellow   SPEC GRAV UA  1 015   PH UA  6 0   GLUCOSE UA mg/dl Negative   KETONES UA mg/dl Negative   BLOOD UA  Negative   PROTEIN UA mg/dl Negative   NITRITE UA  Negative   BILIRUBIN UA  Negative   UROBILINOGEN UA E U /dl 0 2   LEUKOCYTES UA  Trace*   WBC UA /hpf 4-10*   RBC UA /hpf None Seen   BACTERIA UA /hpf Occasional   EPITHELIAL CELLS WET PREP /hpf Innumerable*     Results from last 7 days   Lab Units 07/25/20  1317   STREP PNEUMONIAE ANTIGEN, URINE  Negative   LEGIONELLA URINARY ANTIGEN  Negative     Results from last 7 days   Lab Units 07/24/20  1538 07/24/20  1534   BLOOD CULTURE  Received in Microbiology Lab  Culture in Progress  Received in Microbiology Lab  Culture in Progress  7/25/20 CT CHEST WITHOUT IV CONTRAST  1   Multifocal, multi lobar groundglass infiltrates which appear to have slightly progressed from the prior study  In the setting of clinically suspected/proven COVID-19, the above lung parenchymal findings on CT indicate high confidence level for   COVID-19   2   Trace bilateral pleural effusions  Vital Signs:        07/25/20 0112 07/25/20 0542 07/25/20 0900 07/25/20 0931   BP: 143/73   (!) 191/88 168/88   BP Location:     Left arm     Pulse: 83   93     Resp:     20     Temp:     98 7 °F (37 1 °C)     TempSrc:     Oral     SpO2 w/ 2 L 89 - 94%   92%     Weight:   62 4 kg (137 lb 9 1 oz)       Height:                 Body mass index is 27 79 kg/m²    Temp  Min: 98 7 °F (37 1 °C)  Max: 99 1 °F (37 3 °C)  IBW: 43 2 kg     SpO2:  [89 %-98 %] 92 %    Intake/Output Summary (Last 24 hours) at 7/25/2020 1157  Gross per 24 hour   Intake 1360 ml   Output    Net 1360 ml     Diet John Paul/CHO Controlled; Consistent Carbohydrate Diet Level 2 (5 carb servings/75 grams CHO/meal)    Scheduled Medications:  IV cefTRIAXone (ROCEPHIN) IVPB (premix) 1,000 mg 50 mL     Ordered Dose: 1,000 mg Route: Intravenous Frequency: Every 24 hours @ 100 mL/hr over 30 Minutes   Scheduled Start Date/Time: 07/25/20 1200           ARIPiprazole 15 mg Oral Daily   ascorbic acid 1,000 mg Oral BID   atorvastatin 40 mg Oral HS   busPIRone 15 mg Oral BID   cholecalciferol 2,000 Units Oral Daily   doxycycline hyclate 100 mg Oral Q12H FANY   felodipine 5 mg Oral HS   FLUoxetine 40 mg Oral BID   heparin (porcine) 5,000 Units Subcutaneous Q8H [facility]   insulin lispro 1-5 Units Subcutaneous TID AC   insulin lispro 1-5 Units Subcutaneous HS loratadine 10 mg Oral Daily   losartan 25 mg Oral Daily   nicotine 1 patch Transdermal Daily   pantoprazole 40 mg Oral Daily   saccharomyces boulardii 250 mg Oral BID   sucralfate 1 g Oral 4x Daily   zinc sulfate 220 mg Oral Daily     Continuous IV Infusions:  IVF sodium chloride 0 9 % infusion  Ordered Dose: 75 mL/hr Route: Intravenous Frequency: Continuous @ 75 mL/hr   Scheduled Start Date/Time: 07/24/20 2030         PRN Meds:  acetaminophen 650 mg Oral Q6H PRN  GIVEN X 1   albuterol 2 puff Inhalation Q4H PRN   Labetalol HCl 10 mg Intravenous Q6H PRN SBP > 170 GIVEN X 1   ondansetron 4 mg Intravenous Q6H PRN   pramipexole 0 5 mg Oral Daily PRN     Discharge Plan: To be determined   Inpatient Case Management following for all discharge needs    Network Utilization Review Department  [email or web address]  ATTENTION: Please call with any questions or concerns to [phone] and carefully listen to the prompts so that you are directed to the right person  All voicemails are confidential   Denisse Zimmerman all requests for admission clinical reviews, approved or denied determinations and any other requests to dedicated fax number below belonging to the campus where the patient is receiving treatment   List of dedicated fax numbers for the Facilities:  [address] [place] [address] DENIALS (Administrative/Medical Necessity) [phone]   [address] (Maternity/NICU/Pediatrics) [phone]   [place] [phone]   Giles Barkley [phone]   Erose Parrish [phone]   Jerrol Balloon [phone]   [address] [phone]   [facility]  [phone]   [address]00 [facility]  [address] [facility] 1000 [address] [facility] [phone]

## 2020-07-26 NOTE — ASSESSMENT & PLAN NOTE
BP labile,as high as 629'C systolic  Resume hydrochlorothiazide today  Continue losartan  Increase Felodipine to 10 mg p o  Daily on discharge  Monitor BP at home   Goal -140

## 2020-07-26 NOTE — ASSESSMENT & PLAN NOTE
No results found for: HGBA1C    Recent Labs     07/25/20  2146 07/26/20  0722 07/26/20  1111 07/26/20  1610   POCGLU 130 103 101 93       Blood Sugar Average: Last 72 hrs:  Patient is on metformin which was held due to elevated creatinine on admission  Patient was on Humalog sliding scale with Accu-Cheks q a c   And HS  Decrease metformin to once a day on discharge in view of low normal BS without taking metformin here,  Diabetic diet  (P) 101 125

## 2020-07-26 NOTE — UTILIZATION REVIEW
Initial Clinical Review    Admission: Date/Time/Statement: Admission Orders (From admission, onward)     Ordered        07/24/20 1816  Inpatient Admission (expected length of stay for this patient Order details is greater than two midnights)  Once                Orders Placed This Encounter   Procedures    Inpatient Admission (expected length of stay for this patient Order details is greater than two midnights)     Standing Status:   Standing     Number of Occurrences:   1     Order Specific Question:   Admitting Physician     Answer:   Everardo Jackson     Order Specific Question:   Level of Care     Answer:   Med Surg [16]     Order Specific Question:   Estimated length of stay     Answer:   More than 2 Midnights     Order Specific Question:   Certification     Answer:   I certify that inpatient services are medically necessary for this patient for a duration of greater than two midnights  See H&P and MD Progress Notes for additional information about the patient's course of treatment  ED Arrival Information     Expected Arrival Acuity Means of Arrival Escorted By Service Admission Type    - 7/24/2020 14:37 Urgent Walk-In Family Member General Medicine Urgent    Arrival Complaint   SOB + Back Pain     Chief Complaint   Patient presents with    Shortness of Breath     patient c/o SOB and possible fever x four days  denies cough  Assessment/Plan: 75year old female resident of [place] visiting daughter in this area - with PMHx HTN, HLD, CAD, carotid disease, DM2, CKD Stage 3, Depression, tobacco dependence  Presented urgently to [address] [place] [address] ED 2nd 4  day history of SOB/ DANIEL which is "especially worse with exertion", cough with mucus phlegm and fever with chills yesterday  In ED -Temp 99 1  HR 99  RR 24   Room air SpO2 low 90s but dropped into high 80s on exertion  LABS:  BUN/CR 39/ 1 9 proBNP 1000  D-dimer 1 06  COVID-19 testing negative    Chest x-ray showed bilateral ground-glass densities suggesting pneumonia  ED Tx: IVF NSS, IV Zithromax    Admitted inpatient 7/24/20 at 1816 2nd SOB with Pneumonia/ suspected COVID -19 infection - Plan: Isolation with negative pressure room, O2 prn, IVF, IV Rocephin and Zithromax, repeat COVID testing, Pulmonary Consult -    ED Triage Vitals [07/24/20 1458]   Temperature Pulse Respirations Blood Pressure SpO2   99 1 °F (37 3 °C) 99 (!) 24 169/78 96 %      Temp Source Heart Rate Source Patient Position - Orthostatic VS BP Location FiO2 (%)   Tympanic Monitor Sitting Right arm --      Pain Score       5        Wt Readings from Last 1 Encounters:   07/25/20 62 4 kg (137 lb 9 1 oz)     Additional Vital Signs:   24/20 23:14:17    96    188/96Abnormal   127  89 %Abnormal           07/24/20 2038    96          28  2 L/min  Nasal cannula    07/24/20 20:19:14  98 7 °F (37 1 °C)  82    147/74  98  98 %          07/24/20 1930    86    161/73  105  97 %          07/24/20 1900    88  24Abnormal   155/70  101  98 %            Pertinent Labs/Diagnostic Test Results:   Results from last 7 days   Lab Units 07/25/20  1111 07/24/20  1534   SARS-COV-2  Negative Negative     Results from last 7 days   Lab Units 07/24/20  2143 07/24/20  1534   WBC Thousand/uL  --  8 70   HEMOGLOBIN g/dL  --  11 6   HEMATOCRIT %  --  35 0   PLATELETS Thousands/uL 175 187   NEUTROS ABS Thousands/µL  --  6 47     Results from last 7 days   Lab Units 07/24/20  1606   SODIUM mmol/L 138   POTASSIUM mmol/L 3 9   CHLORIDE mmol/L 101   CO2 mmol/L 27   ANION GAP mmol/L 10   BUN mg/dL 39*   CREATININE mg/dL 1 99*   EGFR ml/min/1 73sq m 26   CALCIUM mg/dL 8 6     Results from last 7 days   Lab Units 07/24/20  1606   AST U/L 28   ALT U/L 40   ALK PHOS U/L 129*   TOTAL PROTEIN g/dL 7 2   ALBUMIN g/dL 2 6*   TOTAL BILIRUBIN mg/dL 0 30     Results from last 7 days   Lab Units 07/24/20  2126   POC GLUCOSE mg/dl 102     Results from last 7 days   Lab Units 07/24/20  1606   GLUCOSE RANDOM mg/dL 107     Results from last 7 days   Lab Units 07/24/20  1607   CK TOTAL U/L 61     Results from last 7 days   Lab Units 07/24/20  2143 07/24/20  1606   TROPONIN I ng/mL <0 02 <0 02     Results from last 7 days   Lab Units 07/24/20  1606   D-DIMER QUANTITATIVE ug/ml FEU 1 06*     Results from last 7 days   Lab Units 07/24/20  1606   PROTIME seconds 12 8   INR  0 97   PTT seconds 38*     Results from last 7 days   Lab Units 07/24/20  2143 07/24/20  1607   PROCALCITONIN ng/ml 0 18 0 26*     Results from last 7 days   Lab Units 07/24/20  1534   LACTIC ACID mmol/L 0 9     Results from last 7 days   Lab Units 07/24/20  1606   NT-PRO BNP pg/mL 1,035*     Results from last 7 days   Lab Units 07/24/20  1607   FERRITIN ng/mL 115     Results from last 7 days   Lab Units 07/24/20  1607   CRP mg/L 180 0*     Results from last 7 days   Lab Units 07/24/20  1813   CLARITY UA  Slightly Cloudy   COLOR UA  Yellow   SPEC GRAV UA  1 015   PH UA  6 0   GLUCOSE UA mg/dl Negative   KETONES UA mg/dl Negative   BLOOD UA  Negative   PROTEIN UA mg/dl Negative   NITRITE UA  Negative   BILIRUBIN UA  Negative   UROBILINOGEN UA E U /dl 0 2   LEUKOCYTES UA  Trace*   WBC UA /hpf 4-10*   RBC UA /hpf None Seen   BACTERIA UA /hpf Occasional   EPITHELIAL CELLS WET PREP /hpf Innumerable*     Results from last 7 days   Lab Units 07/24/20  1538 07/24/20  1534   BLOOD CULTURE  Received in Microbiology Lab  Culture in Progress  Received in Microbiology Lab  Culture in Progress  CHEST X RAY -  Patchy bilateral groundglass opacity, question viral infection, possibly Covid-19 pneumonia, despite negative PCR      ED Treatment:   Medication Administration from 07/24/2020 1437 to 07/24/2020 1955       Date/Time Order Dose Route Action     07/24/2020 1824 sodium chloride 0 9 % bolus 500 mL 0 mL Intravenous Stopped     07/24/2020 1724 sodium chloride 0 9 % bolus 500 mL 500 mL Intravenous New Bag     07/24/2020 1948 azithromycin (ZITHROMAX) 500 mg in sodium chloride 0 9% 250mL IVPB 500 mg 0 mg Intravenous Stopped     07/24/2020 1836 azithromycin (ZITHROMAX) 500 mg in sodium chloride 0 9% 250mL IVPB 500 mg 500 mg Intravenous New Bag     Past Medical History:   Diagnosis Date    CAD (coronary artery disease)     Carotid arterial disease (HCC)     CKD (chronic kidney disease) stage 3, GFR 30-59 ml/min (HCC)     Gastritis     HTN (hypertension)     T2DM (type 2 diabetes mellitus) ([facility])      Admitting Diagnosis: Back pain [M54 9]  Pneumonia [J18 9]  Dyspnea [R06 00]  Hypoxia [R09 02]  Suspected Covid-19 Virus Infection [Z20 828]    Age/Sex: 75 y o  female    Admission Orders:  Isolation with negative pressure room  VS q4hrs  Nasal O2 at 2 L/min Titrate per SpO2  Continuous Pulse Oximetry  Daily weight  I+O q shift  Diet John Paul/CHO Controlled; Consistent Carbohydrate Diet Level 2 (5 carb servings/75 grams CHO/meal)    Scheduled Medications:  IV cefTRIAXone (ROCEPHIN) IVPB (premix) 1,000 mg 50 mL     Ordered Dose: 1,000 mg Route: Intravenous Frequency: Every 24 hours @ 100 mL/hr over 30 Minutes     azithromycin (ZITHROMAX) tablet 500 mg   Ordered Dose: 500 mg Route: Oral Frequency: Once   Administration Dose: 500 mg      Scheduled Start Date/Time: 07/24/20 1800        ARIPiprazole 15 mg Oral Daily   ascorbic acid 1,000 mg Oral BID   atorvastatin 40 mg Oral HS   busPIRone 15 mg Oral BID   cholecalciferol 2,000 Units Oral Daily   doxycycline hyclate 100 mg Oral Q12H FANY   felodipine 5 mg Oral HS   FLUoxetine 40 mg Oral BID   heparin (porcine) 5,000 Units Subcutaneous Q8H [address]   insulin lispro 1-5 Units Subcutaneous TID AC   insulin lispro 1-5 Units Subcutaneous HS   loratadine 10 mg Oral Daily   losartan 25 mg Oral Daily   nicotine 1 patch Transdermal Daily   pantoprazole 40 mg Oral Daily   saccharomyces boulardii 250 mg Oral BID   sucralfate 1 g Oral 4x Daily   zinc sulfate 220 mg Oral Daily     Continuous IV Infusions:  IVF sodium chloride 0 9 % infusion  Ordered Dose: 75 mL/hr Route: Intravenous Frequency: Continuous @ 75 mL/hr   Scheduled Start Date/Time: 07/24/20 2030         PRN Meds:  acetaminophen 650 mg Oral Q6H PRN  GIVEN X 1   albuterol 2 puff Inhalation Q4H PRN   Labetalol HCl 10 mg Intravenous Q6H PRN SBP > 170 GIVEN X 1   ondansetron 4 mg Intravenous Q6H PRN   pramipexole 0 5 mg Oral Daily PRN     IP CONSULT TO PULMONOLOGY    Network Utilization Review Department  [email or web address]  ATTENTION: Please call with any questions or concerns to [phone] and carefully listen to the prompts so that you are directed to the right person  All voicemails are confidential   Yue Bee all requests for admission clinical reviews, approved or denied determinations and any other requests to dedicated fax number below belonging to the campus where the patient is receiving treatment   List of dedicated fax numbers for the Facilities:  [address] [place] [address] DENIALS (Administrative/Medical Necessity) [phone]   [address] [place] [address] (Maternity/NICU/Pediatrics) [phone]   Vianca Brandon [phone]   Bethany Posada [phone]   Andrés Nicole [phone]   Shabbir Faith [phone]   1205 [facility] 1525 [facility] [phone]   [facility]  [phone]   [address] [facility]  2401 [facility][note 3] 1000 [address] [facility] [phone]

## 2020-07-26 NOTE — ASSESSMENT & PLAN NOTE
Patient's baseline creatinine appears to be around 1 5-1 9 recently  Creatinine slightly above baseline on admission  Received gentle IV hydration  Creatinine improving  Metformin was on hold since admission    May resume at lower dose daily on discharge as BS low normal during stay  Avoid nephrotoxic agents and hypotension  Resumed hydrochlorothiazide    Results from last 7 days   Lab Units 07/26/20  0549 07/25/20  0618 07/24/20  1606   BUN mg/dL 22 33* 39*   CREATININE mg/dL 1 30 1 69* 1 99*

## 2020-07-26 NOTE — DISCHARGE SUMMARY
Discharge- Thyra Boxer 1955, 75 y o  female MRN: [id number]    Unit/Bed#: [address] [place] [address] Encounter: [id number]    Primary Care Provider: No primary care provider on file  Date and time admitted to hospital: 7/24/2020  2:59 PM        * Suspected Covid-19 Virus Infection  Assessment & Plan  Patient is visiting from [place]  Arrived in plane 5 days ago,SOB/subjective fever started 4 days ago  COVID-19 testing was negative in ED, repeat x2 negative  Chest x-ray showed bilateral ground-glass opacities  CT chest - Multifocal, multi lobar groundglass infiltrates which appear to have slightly progressed from the prior study  High confidence level for COVID-19  Repeat chest x-ray today improving  Patient's O2 saturation was low 90s at rest but dropping to high 80s with exertion in ED  Currently on room, satting low 90s at rest   Patient's type is O positive  Troponin x3 negative  Total CK normal  ProBNP 1035  Initial Procalcitonin 0 26, normalized x2  WBC normal, no bands  Trend inflammatory markers, improving  IL 6 level pending  Blood cultures x2 negative at 24 hours  Patient was unable to give sputum  Urine antigens negative  RP 2 negative  Started patient on vitamin D3 2000 units daily, vitamin-C 1 g b i d , zinc 220mg p o  Daily, Lipitor 40 mg p o  HS on admission  Will continue on discharge per COVID algorithm  Patient received IV Zithromax in ED  Received 1 dose of IV Rocephin and 2 days of doxycycline b i d  Stopped due to negative procalcitonin  ProAir p r n  Out of bed and ambulate  Incentive spirometer  Maintained contact and airborne precaution  Patient seen by Pulmonary, appreciate input  Unclear etiology of diffuse ground-glass opacities  Consider atypical or viral infection  Also consider vapor injury, hypersensitivity, autoimmune disease  Autoimmune serologies and hypersensitivity panel pending  Okay to DC isolation  Patient is cleared for discharge by Pulmonary    No indication for Eliquis per Pulmonary on discharge  Will continue vitamin-C, zinc for 7 days and Lipitor for 2 weeks on discharge  Follow-up Pulmonary in 1 week  Patient does not qualify for home O2 per ambulatory O2 eval prior to discharge  Satting 90% on RA with ambulation  Advised patient to stay at home for 14 days and Practice social distancing on discharge  Spoke to patient's family as well  Results from last 7 days   Lab Units 07/26/20  0552 07/25/20  1111 07/24/20  1534   SARS-COV-2  Negative Negative Negative     Results from last 7 days   Lab Units 07/26/20  0549 07/25/20  0618 07/24/20  1607 07/24/20  1606   CRP mg/L 103 4* 164 2* 180 0*  --    FERRITIN ng/mL 132 130 115  --    D-DIMER QUANTITATIVE ug/ml FEU 0 70* 0 93*  --  1 06*      Results from last 7 days   Lab Units 07/26/20  0549 07/24/20  2143 07/24/20  1607   PROCALCITONIN ng/ml <0 05 0 18 0 26*              SOB (shortness of breath)resolved as of 7/26/2020  Assessment & Plan  Patient present to the ED with shortness of breath for 3 days  Patient is visiting from [place] and arrived to [place] on 17th  Patient does have fever with some chills at home  Patient has cough with mucus production at home  In the ED patient had low-grade fever and tachypnea  O2 saturation was in low 90s but dropped to high 80s with exertion  COVID-19 testing x 3 were negative  Management as above  Patient satting low 90s on room air at rest   Satting 90% with ambulation during ambulatory O2 eval     HTN (hypertension)  Assessment & Plan  BP labile,as high as 696'B systolic  Resume hydrochlorothiazide today  Continue losartan  Increase Felodipine to 10 mg p o  Daily on discharge  Monitor BP at home  Goal -140    Chronic diastolic congestive heart failure (HCC)  Assessment & Plan  Wt Readings from Last 3 Encounters:   07/26/20 60 8 kg (134 lb 0 6 oz)     2D echo in 2017 showed EF 63%,G1DD    ProBNP 1035  No signs of fluid overload on exam or imaging  Monitor daily weight  Continue losartan  Resumed HCTZ  Monitor weight at home        T2DM (type 2 diabetes mellitus) [facility])  Assessment & Plan  No results found for: HGBA1C    Recent Labs     07/25/20  2146 07/26/20  0722 07/26/20  1111 07/26/20  1610   POCGLU 130 103 101 93       Blood Sugar Average: Last 72 hrs:  Patient is on metformin which was held due to elevated creatinine on admission  Patient was on Humalog sliding scale with Accu-Cheks q a c  And HS  Decrease metformin to once a day on discharge in view of low normal BS without taking metformin here,  Diabetic diet  (P) 101 125      Gastritis  Assessment & Plan  Continue Protonix and Carafate  History of rectal bleeding    CAD (coronary artery disease)  Assessment & Plan  Patient follows up with cardiology, cardiology recommended outpatient nuclear stress test  No symptoms of angina at the current time  Troponin x3 negative      CKD (chronic kidney disease) stage 3, GFR 30-59 ml/min ([facility])  Assessment & Plan  Patient's baseline creatinine appears to be around 1 5-1 9 recently  Creatinine slightly above baseline on admission  Received gentle IV hydration  Creatinine improving  Metformin was on hold since admission  May resume at lower dose daily on discharge as BS low normal during stay  Avoid nephrotoxic agents and hypotension  Resumed hydrochlorothiazide    Results from last 7 days   Lab Units 07/26/20  0549 07/25/20  0618 07/24/20  1606   BUN mg/dL 22 33* 39*   CREATININE mg/dL 1 30 1 69* 1 99*             Discharging Physician / Practitioner: ALLISON Palencia  PCP: No primary care provider on file  Admission Date: 7/24/2020  Discharge Date: 07/26/20    Reason for Admission: Shortness of Breath (patient c/o SOB and possible fever x four days    denies cough   )        Resolved Problems  Date Reviewed: 7/26/2020          Resolved    SOB (shortness of breath) 7/26/2020     Resolved by  Cleveland Chin, [address] [place] [address] Stay:  [facility] TO PULMONOLOGY    Procedures Performed:     · None    Significant Findings / Test Results:     · None  Results from last 7 days   Lab Units 07/26/20  0549 07/25/20  0618 07/24/20 2143 07/24/20  1534   WBC Thousand/uL 6 06 6 35  --  8 70   HEMOGLOBIN g/dL 11 1* 10 7*  --  11 6   PLATELETS Thousands/uL 227 195 175 187     Results from last 7 days   Lab Units 07/26/20  0549 07/25/20  0618 07/24/20  1606   SODIUM mmol/L 137 137 138   POTASSIUM mmol/L 3 6 3 6 3 9   CHLORIDE mmol/L 102 105 101   CO2 mmol/L 22 22 27   BUN mg/dL 22 33* 39*   CREATININE mg/dL 1 30 1 69* 1 99*   CALCIUM mg/dL 8 6 7 9* 8 6   TOTAL BILIRUBIN mg/dL 0 30 0 30 0 30   ALK PHOS U/L 122* 117* 129*   ALT U/L 27 31 40   AST U/L 24 23 28     Results from last 7 days   Lab Units 07/24/20  1606   INR  0 97     Results from last 7 days   Lab Units 07/25/20  0618 07/24/20  2143 07/24/20  1606   TROPONIN I ng/mL <0 02 <0 02 <0 02     No results found for: HGBA1C  Results from last 7 days   Lab Units 07/26/20  1610 07/26/20  1111 07/26/20  0722 07/25/20  2146 07/25/20  1625 07/25/20  1113 07/25/20  0713 07/24/20  2126   POC GLUCOSE mg/dl 93 101 103 130 104 86 90 102     Results from last 7 days   Lab Units 07/26/20  0549 07/24/20  2143 07/24/20  1607 07/24/20  1534   LACTIC ACID mmol/L  --   --   --  0 9   PROCALCITONIN ng/ml <0 05 0 18 0 26*  --      Blood Culture:   Lab Results   Component Value Date    BLOODCX No Growth at 24 hrs  07/24/2020    BLOODCX No Growth at 24 hrs  07/24/2020     Urine Culture:   Lab Results   Component Value Date    URINECX 10,000-19,000 cfu/ml  07/24/2020     Sputum Culture: No components found for: SPUTUMCX  Wound Culture: No results found for: WOUNDCULT     XR chest portable   Final Result by Max Conner MD (07/26 8677)      Comparison to the previous chest x-ray, diminished conspicuity of previously seen hazy airspace opacities suggesting partially improved pulmonary infiltrates              Workstation performed: [id number]         CT chest wo contrast   Final Result by Ian Reid DO (07/25 1961)   1  Multifocal, multi lobar groundglass infiltrates which appear to have slightly progressed from the prior study  In the setting of clinically suspected/proven COVID-19, the above lung parenchymal findings on CT indicate high confidence level for    COVID-19       2   Trace bilateral pleural effusions  The study was marked in [facility] for immediate notification  Workstation performed: [id number]         XR chest 1 view portable   Final Result by Ne Castellanos MD (07/24 0089)      Patchy bilateral groundglass opacity, question viral infection, possibly Covid-19 pneumonia, despite negative PCR  The study was marked in [facility] for immediate notification  Workstation performed: [id number]                Incidental Findings:   · noen     Test Results Pending at Discharge (will require follow up):   · IL 6, autoimmune serologies, hypersensitivity panel     Outpatient Tests Requested:  · None    Complications:  None    Reason for Admission:   Chief Complaint   Patient presents with    Shortness of Breath     patient c/o SOB and possible fever x four days  denies cough  Hospital Course:     Per HPI: Carter Baumgarten is a 75 y o  female patient with a PMH of CAD, CHF CKD, type 2 diabetes, gastritis, hypertension who originally presented to the hospital on 7/24/2020 due to suspected COVID-19 infection due to ground glass infiltrates on images  Patient visiting from [address] [place] [address] 5 days prior to admission by air  COVID-19 PCR x 3 negative  Patient presented with SOB and subjective fever  Patient was treated with one dose of iv Zithromax, 1 dose of IV Rocephin, 2 days of doxycycline, and vitamin-C/zinc/ Lipitor per protocol  Inflammatory markers trending down since admission  Procalcitonin was initially slight elevated, repeat x2 normalized    Patient denies SOB past 2 days with ambulation in her room  Was satting low 90s on room air  Patient was seen by Pulmonary during her stay  Unclear etiology of diffuse ground-glass opacities on images  Autoimmune serologies and hypersensitivity panel pending at time of discharge  Repeat chest x-ray showed improvement today  Patient is cleared for discharge by Pulmonary  Okay to DC isolation  No need for Eliquis on discharge per Pulmonary  I will continue vitamin-C, zinc for 7 days and Lipitor for 2 weeks on discharge  Continue incentive spirometer on discharge  Spoke to patient's family, advised patient to stay home for 14 days and maintain social distancing at home  Patient received gentle IV hydration for mild CLOTILDE on CKD on admission  Increased patient's felodipine to 10mg po daily on discharge due to labile BP at times  Advised patient to monitor BP at home,goal -140  Follow-up Pulmonary in 1 week  Hospital Course:    Please see above list of diagnoses and related plan for additional information  Condition at Discharge: good       Discharge Day Visit / Exam:     Subjective:  Patient denies SOB, cough, fever, chills  Denies chest pain, dizziness, nausea, vomiting, diarrhea, constipation  Reports headache currently  Vitals: Blood Pressure: 168/95 (07/26/20 1612)  Pulse: 85 (07/26/20 1612)  Temperature: 98 1 °F (36 7 °C) (07/26/20 1612)  Temp Source: Oral (07/26/20 0724)  Respirations: 16 (07/26/20 1612)  Height: 4' 11" (149 9 cm) (07/24/20 1958)  Weight - Scale: 60 8 kg (134 lb 0 6 oz) (07/26/20 0831)  SpO2: 95 % (07/26/20 1612)  Exam:   Physical Exam   Constitutional: She is oriented to person, place, and time  She appears well-developed and well-nourished  HENT:   Head: Normocephalic and atraumatic  Neck: Normal range of motion  Neck supple  No JVD present  No tracheal deviation present  No thyromegaly present  Cardiovascular: Normal rate and regular rhythm  No murmur heard    Pulmonary/Chest: Effort normal  No respiratory distress  She has no wheezes  She has no rales  Clear diminished bilateral, on room air, respirations easy   Abdominal: Soft  Bowel sounds are normal  She exhibits no distension  There is no tenderness  There is no guarding  Musculoskeletal: She exhibits no edema, tenderness or deformity  Neurological: She is alert and oriented to person, place, and time  Skin: Skin is warm and dry  Psychiatric: She has a normal mood and affect  Judgment normal    Nursing note and vitals reviewed  Discharge instructions/Information to patient and family:   See after visit summary for information provided to patient and family  Provisions for Follow-Up Care:  See after visit summary for information related to follow-up care and any pertinent home health orders  Disposition:     Home    Planned Readmission: no     Discharge Statement:  I spent 40 minutes discharging the patient  This time was spent on the day of discharge  I had direct contact with the patient on the day of discharge  Greater than 50% of the total time was spent examining patient, answering all patient questions, arranging and discussing plan of care with patient as well as directly providing post-discharge instructions  Additional time then spent on discharge activities  Discharge Medications:  See after visit summary for reconciled discharge medications provided to patient and family        ** Please Note: This note has been constructed using a voice recognition system **

## 2020-07-26 NOTE — DISCHARGE INSTRUCTIONS
Monitor blood sugar at home  Goal BS pre meal < 140,random < 180  Decreased your metformin to daily as BS has been low normal here without taking metformin  Monitor BP at home  Goal -140  We increased your Felodipine to 10mg at bedtime  Continue vitamin-C and zinc for 7 days  Continue Lipitor for 2 weeks  Follow up pulmonary in one week  Please call to make appointment  [address]    Your healthcare provider and/or public health staff have evaluated you and have determined that you do not need to remain in the hospital at this time  At this time you can be isolated at home where you will be monitored by staff from your local or state health department  You should carefully follow the prevention and isolation steps below until a healthcare provider or local or state health department says that you can return to your normal activities  Stay home except to get medical care    People who are mildly ill with COVID-19 are able to isolate at home during their illness  You should restrict activities outside your home, except for getting medical care  Do not go to work, school, or public areas  Avoid using public transportation, ride-sharing, or taxis  Separate yourself from other people and animals in your home    People: As much as possible, you should stay in a specific room and away from other people in your home  Also, you should use a separate bathroom, if available  Animals: You should restrict contact with pets and other animals while you are sick with COVID-19, just like you would around other people  Although there have not been reports of pets or other animals becoming sick with COVID-19, it is still recommended that people sick with COVID-19 limit contact with animals until more information is known about the virus  When possible, have another member of your household care for your animals while you are sick   If you are sick with COVID-19, avoid contact with your pet, including petting, snuggling, being kissed or licked, and sharing food  If you must care for your pet or be around animals while you are sick, wash your hands before and after you interact with pets and wear a facemask  See COVID-19 and Animals for more information  Call ahead before visiting your doctor    If you have a medical appointment, call the healthcare provider and tell them that you have or may have COVID-19  This will help the healthcare providers office take steps to keep other people from getting infected or exposed  Wear a facemask    You should wear a facemask when you are around other people (e g , sharing a room or vehicle) or pets and before you enter a healthcare providers office  If you are not able to wear a facemask (for example, because it causes trouble breathing), then people who live with you should not stay in the same room with you, or they should wear a facemask if they enter your room  Cover your coughs and sneezes    Cover your mouth and nose with a tissue when you cough or sneeze  Throw used tissues in a lined trash can  Immediately wash your hands with soap and water for at least 20 seconds or, if soap and water are not available, clean your hands with an alcohol-based hand  that contains at least 60% alcohol  Clean your hands often    Wash your hands often with soap and water for at least 20 seconds, especially after blowing your nose, coughing, or sneezing; going to the bathroom; and before eating or preparing food  If soap and water are not readily available, use an alcohol-based hand  with at least 60% alcohol, covering all surfaces of your hands and rubbing them together until they feel dry  Soap and water are the best option if hands are visibly dirty  Avoid touching your eyes, nose, and mouth with unwashed hands      Avoid sharing personal household items    You should not share dishes, drinking glasses, cups, eating utensils, towels, or bedding with other people or pets in your home  After using these items, they should be washed thoroughly with soap and water  Clean all high-touch surfaces everyday    High touch surfaces include counters, tabletops, doorknobs, bathroom fixtures, toilets, phones, keyboards, tablets, and bedside tables  Also, clean any surfaces that may have blood, stool, or body fluids on them  Use a household cleaning spray or wipe, according to the label instructions  Labels contain instructions for safe and effective use of the cleaning product including precautions you should take when applying the product, such as wearing gloves and making sure you have good ventilation during use of the product  Monitor your symptoms    Seek prompt medical attention if your illness is worsening (e g , difficulty breathing)  Before seeking care, call your healthcare provider and tell them that you have, or are being evaluated for, COVID-19  Put on a facemask before you enter the facility  These steps will help the healthcare providers office to keep other people in the office or waiting room from getting infected or exposed  Ask your healthcare provider to call the local or [facility] health department  Persons who are placed under active monitoring or facilitated self-monitoring should follow instructions provided by their local health department or occupational health professionals, as appropriate  If you have a medical emergency and need to call 911, notify the dispatch personnel that you have, or are being evaluated for COVID-19  If possible, put on a facemask before emergency medical services arrive      Discontinuing home isolation    Patients with confirmed COVID-19 should remain under home isolation precautions until the following conditions are met:   - They have had no fever for at least 72 hours (that is three full days of no fever without the use medicine that reduces fevers)  AND  - other symptoms have improved (for example, when their cough or shortness of breath have improved)  AND  - at least 10 days have passed since their symptoms first appeared  Patients with confirmed COVID-19 should also notify close contacts (including their workplace) and ask that they self-quarantine  Currently, close contact is defined as being within 6 feet for 10 minutes or more from the period 48 hours before symptom onset to the time at which the patient went into isolation  Close contacts of patients diagnosed with COVID-19 should be instructed by the patient to self-quarantine for 14 days from the last time of their last contact with the patient       Source: [employer]

## 2020-07-26 NOTE — ASSESSMENT & PLAN NOTE
Wt Readings from Last 3 Encounters:   07/26/20 60 8 kg (134 lb 0 6 oz)     2D echo in 2017 showed EF 63%,G1DD  ProBNP 1035  No signs of fluid overload on exam or imaging  Monitor daily weight    Continue losartan  Resumed HCTZ  Monitor weight at home

## 2020-07-26 NOTE — ASSESSMENT & PLAN NOTE
Patient is visiting from [place]  Arrived in plane 5 days ago,SOB/subjective fever started 4 days ago  COVID-19 testing was negative in ED, repeat x2 negative  Chest x-ray showed bilateral ground-glass opacities  CT chest - Multifocal, multi lobar groundglass infiltrates which appear to have slightly progressed from the prior study  High confidence level for COVID-19  Repeat chest x-ray today improving  Patient's O2 saturation was low 90s at rest but dropping to high 80s with exertion in ED  Currently on room, satting low 90s at rest   Patient's type is O positive  Troponin x3 negative  Total CK normal  ProBNP 1035  Initial Procalcitonin 0 26, normalized x2  WBC normal, no bands  Trend inflammatory markers, improving  IL 6 level pending  Blood cultures x2 negative at 24 hours  Patient was unable to give sputum  Urine antigens negative  RP 2 negative  Started patient on vitamin D3 2000 units daily, vitamin-C 1 g b i d , zinc 220mg p o  Daily, Lipitor 40 mg p o  HS on admission  Will continue on discharge per COVID algorithm  Patient received IV Zithromax in ED  Received 1 dose of IV Rocephin and 2 days of doxycycline b i d  Stopped due to negative procalcitonin  ProAir p r n  Out of bed and ambulate  Incentive spirometer  Maintained contact and airborne precaution  Patient seen by Pulmonary, appreciate input  Unclear etiology of diffuse ground-glass opacities  Consider atypical or viral infection  Also consider vapor injury, hypersensitivity, autoimmune disease  Autoimmune serologies and hypersensitivity panel pending  Okay to DC isolation  Patient is cleared for discharge by Pulmonary  No indication for Eliquis per Pulmonary on discharge  Will continue vitamin-C, zinc for 7 days and Lipitor for 2 weeks on discharge  Follow-up Pulmonary in 1 week  Patient does not qualify for home O2 per ambulatory O2 eval prior to discharge  Satting 90% on RA with ambulation    Advised patient to stay at home for 14 days and Practice social distancing on discharge  Spoke to patient's family as well            Results from last 7 days   Lab Units 07/26/20  0552 07/25/20  1111 07/24/20  1534   SARS-COV-2  Negative Negative Negative     Results from last 7 days   Lab Units 07/26/20  0549 07/25/20  0618 07/24/20  1607 07/24/20  1606   CRP mg/L 103 4* 164 2* 180 0*  --    FERRITIN ng/mL 132 130 115  --    D-DIMER QUANTITATIVE ug/ml FEU 0 70* 0 93*  --  1 06*      Results from last 7 days   Lab Units 07/26/20  0549 07/24/20  2143 07/24/20  1607   PROCALCITONIN ng/ml <0 05 0 18 0 26*

## 2020-07-26 NOTE — PROGRESS NOTES
Progress Note - Pulmonary   Mindy Naylor 75 y o  female MRN: [id number]  Unit/Bed#: [address] [place] [address] Encounter: [id number]      Assessment and Plan:  1  Pneumonia; abnormal CT chest  · Unclear etiology of diffuse ground-glass opacities  Consider atypical or viral infection  Also consider vaping injury, hypersensitivity, autoimmune lung disease, etc   · RP2 negative  · COVID19 negative x 3  · Autoimmune serologies and hypersensitivity panel pending  · Check repeat chest x-ray  · Given bibasilar sparing, chest x-ray findings may represent hypersensitivity pneumonia  Fortunately, patient is now asymptomatic and remains on room air  · Check CXR today  · She should contact our office or return to the hospital if she has return of her symptoms  · I advised her that are COVID19 testing is not perfect  She should wear a mask and socially distance  · Procalcitonin negative x 2; monitor off of antibiotics  · Monitor off of steroids  2  Suspected COVID19 infection  · Three negative test results noted  · Ok to d/c isolation   3  Tobacco dependence syndrome  1  Encourage cessation  4  CAD; hypertension  1  Cont  Statin and o/p anti hypertensives  2  Cont  Plendil  5  Diabetes  1  Accuchecks with ISS  2  Monitor bs closely if steroids are initiated  6  CKD stage 3  1  Creatinine improved, now at baseline  2  Renally dose medications as appropriate  7  Overweight due to excess calorie intake with BMI 27 79  1  Weight loss     Chest x-ray is unchanged or improved, patient is okay for discharge from my standpoint  She should contact our office or return to the hospital with any return of her symptoms  Subjective:   Found lying prone in bed  Awake and in good spirits  Denies shortness of breath  States she ambulated around the room without breathlessness  Denies cough  States she does not vape  Denies new exposures or inhalations    States her daughter has been cleaning her house and she was exposed to those chemicals  Objective:   Afebrile  Vitals: Blood pressure (!) 173/87, pulse 78, temperature 98 1 °F (36 7 °C), temperature source Oral, resp  rate 20, height 4' 11" (1 499 m), weight 60 8 kg (134 lb 0 6 oz), SpO2 93 % , RA, Body mass index is 27 07 kg/m²  No intake or output data in the 24 hours ending 07/26/20 1340      Physical Exam  GEN: WDWN, nad, comfortable, speaking in full sentences, no accessory muscle use  HEENT: NCAT, EOMI; MMM  CVS: Regular, no m/r/g  LUNGS: CTA b/l  ABD: soft, nd  EXT: No c/c/e  NEURO: No focal deficits  MS: Moving all extremities  SKIN: warm, dry  PSYCH: calm, cooperative    Labs: I have personally reviewed pertinent lab results    Results from last 7 days   Lab Units 07/26/20  0549 07/25/20  0618 07/24/20  2143 07/24/20  1534   WBC Thousand/uL 6 06 6 35  --  8 70   HEMOGLOBIN g/dL 11 1* 10 7*  --  11 6   HEMATOCRIT % 34 5* 32 6*  --  35 0   PLATELETS Thousands/uL 227 195 175 187   NEUTROS PCT %  --  54  --  75   MONOS PCT %  --  9  --  8      Results from last 7 days   Lab Units 07/26/20  0549 07/25/20  0618 07/24/20  1606   POTASSIUM mmol/L 3 6 3 6 3 9   CHLORIDE mmol/L 102 105 101   CO2 mmol/L 22 22 27   BUN mg/dL 22 33* 39*   CREATININE mg/dL 1 30 1 69* 1 99*   CALCIUM mg/dL 8 6 7 9* 8 6   ALK PHOS U/L 122* 117* 129*   ALT U/L 27 31 40   AST U/L 24 23 28              Results from last 7 days   Lab Units 07/24/20  1606   INR  0 97   PTT seconds 38*     Results from last 7 days   Lab Units 07/24/20  1534   LACTIC ACID mmol/L 0 9     0   Lab Value Date/Time    TROPONINI <0 02 07/25/2020 0618    TROPONINI <0 02 07/24/2020 2143    TROPONINI <0 02 07/24/2020 1606     Labs reviewed by me personally show elevated creatinine improved, anemia unchanged, negative procalcitonin x2    Microbiology:  COVID19 negative x 3  RP2 panel negative  Legionella urine antigen negative  Strep pneumo urine antigen negative    Imaging and other studies: I have personally reviewed pertinent films in PACS  CT chest reviewed by me personally shows diffuse ground-glass infiltrates with bibasilar sparing  BRETT Agrawal [facility]

## 2020-07-27 LAB
IL6 SERPL-MCNC: 47.8 PG/ML (ref 0–15.5)
RYE IGE QN: NEGATIVE

## 2020-07-28 RX ORDER — LOSARTAN POTASSIUM 25 MG/1
25 TABLET ORAL DAILY
Qty: 30 TABLET | Refills: 0 | Status: SHIPPED | OUTPATIENT
Start: 2020-07-28 | End: 2020-08-27

## 2020-07-28 NOTE — UTILIZATION REVIEW
Notification of Discharge  This is a Notification of Discharge from our facility [address]  Please be advised that this patient has been discharge from our facility  Below you will find the admission and discharge date and time including the patients disposition  PRESENTATION DATE: 7/24/2020  2:59 PM  OBS ADMISSION DATE:   IP ADMISSION DATE: 7/24/20 1816   DISCHARGE DATE: 7/26/2020  7:57 PM  DISPOSITION: Home/Self Care Home/Self Care   Admission Orders listed below:  Admission Orders (From admission, onward)     Ordered        07/24/20 1816  Inpatient Admission (expected length of stay for this patient Order details is greater than two midnights)  Once                   Please contact the UR Department if additional information is required to close this patient's authorization/case  [facility] Utilization Review Department  Main: [phone] x carefully listen to the prompts  All voicemails are confidential   [email or web address]  Send all requests for admission clinical reviews, approved or denied determinations and any other requests to dedicated fax number below belonging to the campus where the patient is receiving treatment   List of dedicated fax numbers:  [address] DENIALS (Administrative/Medical Necessity) [phone]   [address] (Maternity/NICU/Pediatrics) [phone]   Adeola Lam [phone]   Mei Martin [phone]   Korey Flores [phone]   Gerri Nyhan [facility] [address] [facility] [phone]   [facility]  [phone]   [address] [facility] [address]  [address] [facility] [address] [facility] [phone]

## 2020-07-29 LAB
BACTERIA BLD CULT: NORMAL
BACTERIA BLD CULT: NORMAL
C-ANCA TITR SER IF: NORMAL TITER
GBM AB SER IA-ACNC: 5 UNITS (ref 0–20)
MYELOPEROXIDASE AB SER IA-ACNC: <9 U/ML (ref 0–9)
P-ANCA ATYPICAL TITR SER IF: NORMAL TITER
P-ANCA TITR SER IF: NORMAL TITER
PROTEINASE3 AB SER IA-ACNC: <3.5 U/ML (ref 0–3.5)

## 2020-07-31 LAB
A FUMIGATUS1 AB SER QL ID: NEGATIVE
A PULLULANS AB SER QL: NEGATIVE
LACEYELLA SACCHARI AB SER QL: NEGATIVE
PIGEON SERUM AB QL ID: NEGATIVE
S RECTIVIRGULA AB SER QL ID: NEGATIVE
T VULGARIS AB SER QL ID: NEGATIVE

## 2021-07-12 ENCOUNTER — OFFICE VISIT (OUTPATIENT)
Dept: URBAN - METROPOLITAN AREA CLINIC 32 | Facility: CLINIC | Age: 66
End: 2021-07-12
Payer: COMMERCIAL

## 2021-07-12 DIAGNOSIS — H25.11 AGE-RELATED NUCLEAR CATARACT OF RIGHT EYE: ICD-10-CM

## 2021-07-12 DIAGNOSIS — H35.3132 NONEXUDATIVE AGE-RELATED MACULAR DEGENERATION, BILATERAL, INTERMEDIATE DRY STAGE: Primary | ICD-10-CM

## 2021-07-12 PROCEDURE — 99204 OFFICE O/P NEW MOD 45 MIN: CPT | Performed by: OPTOMETRIST

## 2021-07-12 ASSESSMENT — VISUAL ACUITY
OD: 20/150
OS: 20/150

## 2021-07-12 ASSESSMENT — KERATOMETRY
OD: 43.75
OS: 43.63

## 2021-07-12 ASSESSMENT — INTRAOCULAR PRESSURE
OD: 16
OS: 17

## 2021-07-12 NOTE — IMPRESSION/PLAN
Impression: Nonexudative age-related macular degeneration, bilateral, intermediate dry stage: H35.3132.  Plan: cont vitamins, recommend retina eval

## 2023-05-03 ENCOUNTER — OFFICE VISIT (OUTPATIENT)
Dept: URBAN - METROPOLITAN AREA CLINIC 32 | Facility: CLINIC | Age: 68
End: 2023-05-03
Payer: COMMERCIAL

## 2023-05-03 DIAGNOSIS — H52.4 PRESBYOPIA: ICD-10-CM

## 2023-05-03 DIAGNOSIS — H54.50 LOW VISION, ONE EYE, UNSPECIFIED EYE: ICD-10-CM

## 2023-05-03 DIAGNOSIS — H35.3133 BILATERAL NONEXUDATIVE AGE-RELATED MACULAR DEGENERATION, ADVANCED ATROPHIC W/O SUBFOVEAL INVOLVEMENT: Primary | ICD-10-CM

## 2023-05-03 PROCEDURE — 99214 OFFICE O/P EST MOD 30 MIN: CPT | Performed by: OPTOMETRIST

## 2023-05-03 ASSESSMENT — INTRAOCULAR PRESSURE
OD: 17
OS: 17

## 2023-05-03 ASSESSMENT — VISUAL ACUITY
OS: 20/200
OD: 20/400

## 2023-05-03 ASSESSMENT — KERATOMETRY: OS: 43.13

## 2023-05-03 NOTE — IMPRESSION/PLAN
Impression: Bilateral nonexudative age-related macular degeneration, advanced atrophic w/o subfoveal involvement: H35.3133. Plan: Macular degeneration, dry type with stable vision. Will continue to monitor vision and the patient has been instructed to call with any vision changes. Pt asked about new advancements. Discussed new GA injection and limited benefit, but patient insisted on evaluation with retina provider.

## 2023-05-03 NOTE — IMPRESSION/PLAN
Impression: Low vision, one eye, unspecified eye: H54.50. Plan: Recommend: Mookie 4x Hand Magnifier, Mookie Smart [employer], JESSEE Lights(often available at [employer] or [facility]), Large TV 72'' to 82,'' Audible Books, MaxTV/Max Detail Discussed: ORCAM, iPhone/iPad, Audible Assistants([employer] Candie/Google Home,) Seeing AI arturo, use accessibility on ipads/iphones in the control center.